# Patient Record
Sex: FEMALE | Race: BLACK OR AFRICAN AMERICAN | NOT HISPANIC OR LATINO | URBAN - METROPOLITAN AREA
[De-identification: names, ages, dates, MRNs, and addresses within clinical notes are randomized per-mention and may not be internally consistent; named-entity substitution may affect disease eponyms.]

---

## 2017-04-16 ENCOUNTER — EMERGENCY (EMERGENCY)
Facility: HOSPITAL | Age: 27
LOS: 0 days | Discharge: HOME | End: 2017-04-16
Attending: EMERGENCY MEDICINE
Payer: COMMERCIAL

## 2017-04-16 VITALS
RESPIRATION RATE: 18 BRPM | SYSTOLIC BLOOD PRESSURE: 124 MMHG | TEMPERATURE: 101 F | DIASTOLIC BLOOD PRESSURE: 84 MMHG | HEART RATE: 102 BPM | HEIGHT: 62 IN | OXYGEN SATURATION: 100 % | WEIGHT: 175.05 LBS

## 2017-04-16 VITALS
DIASTOLIC BLOOD PRESSURE: 77 MMHG | OXYGEN SATURATION: 100 % | TEMPERATURE: 99 F | RESPIRATION RATE: 16 BRPM | SYSTOLIC BLOOD PRESSURE: 122 MMHG | HEART RATE: 88 BPM

## 2017-04-16 DIAGNOSIS — J06.9 ACUTE UPPER RESPIRATORY INFECTION, UNSPECIFIED: ICD-10-CM

## 2017-04-16 DIAGNOSIS — R52 PAIN, UNSPECIFIED: ICD-10-CM

## 2017-04-16 PROCEDURE — 99283 EMERGENCY DEPT VISIT LOW MDM: CPT | Mod: 25

## 2017-04-16 RX ORDER — ACETAMINOPHEN 500 MG
650 TABLET ORAL ONCE
Qty: 0 | Refills: 0 | Status: COMPLETED | OUTPATIENT
Start: 2017-04-16 | End: 2017-04-16

## 2017-04-16 RX ADMIN — Medication 650 MILLIGRAM(S): at 02:04

## 2017-04-16 RX ADMIN — Medication 100 MILLIGRAM(S): at 02:16

## 2017-04-16 NOTE — ED PROVIDER NOTE - OBJECTIVE STATEMENT
Pt is a 27 yo lady with no significant past medical history who presents to the ED with cough, sore throat, and rhinorrhea. Has had flu vaccine. Symptoms stated yesterday, and had a fever to 101. Nonproductive cough, no chest pain, no sob, no abdominal pain. 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 No dysuria.

## 2018-05-16 ENCOUNTER — EMERGENCY (EMERGENCY)
Facility: HOSPITAL | Age: 28
LOS: 1 days | Discharge: HOME | End: 2018-05-16
Attending: EMERGENCY MEDICINE | Admitting: EMERGENCY MEDICINE
Payer: COMMERCIAL

## 2018-05-16 VITALS
SYSTOLIC BLOOD PRESSURE: 147 MMHG | DIASTOLIC BLOOD PRESSURE: 78 MMHG | RESPIRATION RATE: 16 BRPM | HEART RATE: 105 BPM | OXYGEN SATURATION: 100 % | TEMPERATURE: 99 F

## 2018-05-16 VITALS
DIASTOLIC BLOOD PRESSURE: 86 MMHG | OXYGEN SATURATION: 98 % | RESPIRATION RATE: 24 BRPM | SYSTOLIC BLOOD PRESSURE: 157 MMHG | HEART RATE: 130 BPM | TEMPERATURE: 99 F

## 2018-05-16 LAB
ALBUMIN SERPL ELPH-MCNC: 3.9 G/DL — SIGNIFICANT CHANGE UP (ref 3.3–5)
ALP SERPL-CCNC: 42 U/L — SIGNIFICANT CHANGE UP (ref 40–120)
ALT FLD-CCNC: 15 U/L — SIGNIFICANT CHANGE UP (ref 4–33)
AST SERPL-CCNC: 16 U/L — SIGNIFICANT CHANGE UP (ref 4–32)
BASOPHILS # BLD AUTO: 0.05 K/UL — SIGNIFICANT CHANGE UP (ref 0–0.2)
BASOPHILS NFR BLD AUTO: 0.4 % — SIGNIFICANT CHANGE UP (ref 0–2)
BILIRUB SERPL-MCNC: 0.3 MG/DL — SIGNIFICANT CHANGE UP (ref 0.2–1.2)
BUN SERPL-MCNC: 12 MG/DL — SIGNIFICANT CHANGE UP (ref 7–23)
CALCIUM SERPL-MCNC: 9.1 MG/DL — SIGNIFICANT CHANGE UP (ref 8.4–10.5)
CHLORIDE SERPL-SCNC: 97 MMOL/L — LOW (ref 98–107)
CO2 SERPL-SCNC: 20 MMOL/L — LOW (ref 22–31)
CREAT SERPL-MCNC: 0.69 MG/DL — SIGNIFICANT CHANGE UP (ref 0.5–1.3)
EOSINOPHIL # BLD AUTO: 0.04 K/UL — SIGNIFICANT CHANGE UP (ref 0–0.5)
EOSINOPHIL NFR BLD AUTO: 0.3 % — SIGNIFICANT CHANGE UP (ref 0–6)
GLUCOSE SERPL-MCNC: 85 MG/DL — SIGNIFICANT CHANGE UP (ref 70–99)
HCG SERPL-ACNC: < 5 MIU/ML — SIGNIFICANT CHANGE UP
HCT VFR BLD CALC: 38.6 % — SIGNIFICANT CHANGE UP (ref 34.5–45)
HGB BLD-MCNC: 12.7 G/DL — SIGNIFICANT CHANGE UP (ref 11.5–15.5)
IMM GRANULOCYTES # BLD AUTO: 0.05 # — SIGNIFICANT CHANGE UP
IMM GRANULOCYTES NFR BLD AUTO: 0.4 % — SIGNIFICANT CHANGE UP (ref 0–1.5)
LYMPHOCYTES # BLD AUTO: 16.9 % — SIGNIFICANT CHANGE UP (ref 13–44)
LYMPHOCYTES # BLD AUTO: 2.26 K/UL — SIGNIFICANT CHANGE UP (ref 1–3.3)
MCHC RBC-ENTMCNC: 28 PG — SIGNIFICANT CHANGE UP (ref 27–34)
MCHC RBC-ENTMCNC: 32.9 % — SIGNIFICANT CHANGE UP (ref 32–36)
MCV RBC AUTO: 85 FL — SIGNIFICANT CHANGE UP (ref 80–100)
MONOCYTES # BLD AUTO: 1.08 K/UL — HIGH (ref 0–0.9)
MONOCYTES NFR BLD AUTO: 8.1 % — SIGNIFICANT CHANGE UP (ref 2–14)
NEUTROPHILS # BLD AUTO: 9.89 K/UL — HIGH (ref 1.8–7.4)
NEUTROPHILS NFR BLD AUTO: 73.9 % — SIGNIFICANT CHANGE UP (ref 43–77)
NRBC # FLD: 0 — SIGNIFICANT CHANGE UP
PLATELET # BLD AUTO: 268 K/UL — SIGNIFICANT CHANGE UP (ref 150–400)
PMV BLD: 9.8 FL — SIGNIFICANT CHANGE UP (ref 7–13)
POTASSIUM SERPL-MCNC: 3.7 MMOL/L — SIGNIFICANT CHANGE UP (ref 3.5–5.3)
POTASSIUM SERPL-SCNC: 3.7 MMOL/L — SIGNIFICANT CHANGE UP (ref 3.5–5.3)
PROT SERPL-MCNC: 7.6 G/DL — SIGNIFICANT CHANGE UP (ref 6–8.3)
RBC # BLD: 4.54 M/UL — SIGNIFICANT CHANGE UP (ref 3.8–5.2)
RBC # FLD: 13.8 % — SIGNIFICANT CHANGE UP (ref 10.3–14.5)
SODIUM SERPL-SCNC: 134 MMOL/L — LOW (ref 135–145)
WBC # BLD: 13.37 K/UL — HIGH (ref 3.8–10.5)
WBC # FLD AUTO: 13.37 K/UL — HIGH (ref 3.8–10.5)

## 2018-05-16 PROCEDURE — 70450 CT HEAD/BRAIN W/O DYE: CPT | Mod: 26

## 2018-05-16 PROCEDURE — 99285 EMERGENCY DEPT VISIT HI MDM: CPT | Mod: 25

## 2018-05-16 RX ORDER — DEXAMETHASONE 0.5 MG/5ML
10 ELIXIR ORAL ONCE
Qty: 0 | Refills: 0 | Status: COMPLETED | OUTPATIENT
Start: 2018-05-16 | End: 2018-05-16

## 2018-05-16 RX ORDER — SODIUM CHLORIDE 9 MG/ML
1000 INJECTION INTRAMUSCULAR; INTRAVENOUS; SUBCUTANEOUS ONCE
Qty: 0 | Refills: 0 | Status: COMPLETED | OUTPATIENT
Start: 2018-05-16 | End: 2018-05-16

## 2018-05-16 RX ORDER — ACETAMINOPHEN 500 MG
650 TABLET ORAL ONCE
Qty: 0 | Refills: 0 | Status: COMPLETED | OUTPATIENT
Start: 2018-05-16 | End: 2018-05-16

## 2018-05-16 RX ORDER — ONDANSETRON 8 MG/1
4 TABLET, FILM COATED ORAL ONCE
Qty: 0 | Refills: 0 | Status: COMPLETED | OUTPATIENT
Start: 2018-05-16 | End: 2018-05-16

## 2018-05-16 RX ORDER — MORPHINE SULFATE 50 MG/1
2 CAPSULE, EXTENDED RELEASE ORAL ONCE
Qty: 0 | Refills: 0 | Status: DISCONTINUED | OUTPATIENT
Start: 2018-05-16 | End: 2018-05-16

## 2018-05-16 RX ORDER — KETOROLAC TROMETHAMINE 30 MG/ML
15 SYRINGE (ML) INJECTION ONCE
Qty: 0 | Refills: 0 | Status: DISCONTINUED | OUTPATIENT
Start: 2018-05-16 | End: 2018-05-16

## 2018-05-16 RX ADMIN — Medication 650 MILLIGRAM(S): at 04:37

## 2018-05-16 RX ADMIN — Medication 15 MILLIGRAM(S): at 06:27

## 2018-05-16 RX ADMIN — Medication 650 MILLIGRAM(S): at 06:06

## 2018-05-16 RX ADMIN — SODIUM CHLORIDE 1000 MILLILITER(S): 9 INJECTION INTRAMUSCULAR; INTRAVENOUS; SUBCUTANEOUS at 06:06

## 2018-05-16 RX ADMIN — Medication 102 MILLIGRAM(S): at 04:57

## 2018-05-16 RX ADMIN — MORPHINE SULFATE 2 MILLIGRAM(S): 50 CAPSULE, EXTENDED RELEASE ORAL at 04:31

## 2018-05-16 RX ADMIN — MORPHINE SULFATE 2 MILLIGRAM(S): 50 CAPSULE, EXTENDED RELEASE ORAL at 04:18

## 2018-05-16 RX ADMIN — SODIUM CHLORIDE 1000 MILLILITER(S): 9 INJECTION INTRAMUSCULAR; INTRAVENOUS; SUBCUTANEOUS at 04:18

## 2018-05-16 RX ADMIN — ONDANSETRON 4 MILLIGRAM(S): 8 TABLET, FILM COATED ORAL at 04:18

## 2018-05-16 NOTE — ED PROVIDER NOTE - PHYSICAL EXAMINATION
+b/l cervical LAD. +b/l tonsillar erythema and hypertrophy, + exudates.   no LE edema, normal equal distal pulses.   No spinal ttp, neck FROM. Strength 5/5. No bony ttp, FROM all extremities. Normal equal distal pulses.

## 2018-05-16 NOTE — ED PROVIDER NOTE - MEDICAL DECISION MAKING DETAILS
see attg note 27F no PMH p/w several hrs of throat pain/f/c then LOC in setting of working and feeling lightheaded. Slight tachycardia, other vitals wnl. Exam as above.  ddx: Likely pharyngitis (viral vs. bacterial) w/ subsequent syncope (vasovagal/dehydration).   CTH, basic labs, IVF, symptom control, decadron, reassess.

## 2018-05-16 NOTE — ED ADULT TRIAGE NOTE - CHIEF COMPLAINT QUOTE
Pt received on stretcher as rapid response. Pt is employee in the hospital. Pt was walking in [place], slipped and fell backwards, unwitnessed, hit back of head. Approx. down time 10 seconds. Pt appears uncomfortable, guarding, coughing, tearful.

## 2018-05-16 NOTE — ED PROVIDER NOTE - CARE PLAN
Principal Discharge DX:	LOC (loss of consciousness) Principal Discharge DX:	LOC (loss of consciousness)  Secondary Diagnosis:	Pharyngitis

## 2018-05-16 NOTE — ED PROVIDER NOTE - OBJECTIVE STATEMENT
27F no PMH p/w LOC. Pt floor RN working overnight, was fine upon arrival to work then developed throat pain and subjective f/c. Was in pt's room adjusting medication when pt felt lightheaded then remembers waking up on floor. Now only c/o throat pain and frontal HA. Denies neck/back pain, abd pain, SOB/CP, urinary complaints, black/bloody stool, LE pain/swelling, recent travel/immobilization, vision changes, weakness/numbness. No prior syncope. No FMH CAD/clots/sudden death.  Was brought in as rapid response. no actual witnesses to event here in ED.

## 2018-05-16 NOTE — ED ADULT NURSE NOTE - OBJECTIVE STATEMENT
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 pt states she has been feeling sick and her throat is sore with body aches and she got dizzy and synopsized. pt states hit her head and has a headache. pt states wants to be tested for strep. pt gvn surgical mask. pt IV accessed in Amb[facility] with 20 gauge LAC labs sent. waiting further orders will make primary RN Dariela aware of status.

## 2018-05-16 NOTE — ED PROVIDER NOTE - PROGRESS NOTE DETAILS
[place]: CT, labs no acute pathology. Feeling much better, comfortable for dc. Given copy of results.

## 2018-05-16 NOTE — ED PROVIDER NOTE - ATTENDING CONTRIBUTION TO CARE
27F no PMH p/w several hrs of throat pain/f/c then LOC in setting of working and feeling lightheaded.

## 2018-05-16 NOTE — ED PROVIDER NOTE - THROAT FINDINGS
OROPHARYNGEAL EXUDATE/THROAT RED/NO STRIDOR/no PTA/NO DROOLING/uvula midline/NO VESICLES/ULCERS/NO TONGUE ELEVATION

## 2018-05-16 NOTE — CHART NOTE - NSCHARTNOTEFT_GEN_A_CORE
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 Patient was found already on the ground.  Witness claimed patient was talking initially, but then passed out, fell on the ground, and woke up within 10 seconds. Likely vasovagal.   Patient was complaining of headache and having dry heaves and coughing spells.  Initial vitals showed patient was tachycardic 150-160s. Patient's BP was stable, and oxygenation was normal.   Patient transported on stretcher to ED.     Estrella Ayala DO  PGY-3 Internal Medicine  MAR [id number]

## 2019-01-04 ENCOUNTER — TRANSCRIPTION ENCOUNTER (OUTPATIENT)
Age: 29
End: 2019-01-04

## 2019-02-25 ENCOUNTER — TRANSCRIPTION ENCOUNTER (OUTPATIENT)
Age: 29
End: 2019-02-25

## 2019-02-28 ENCOUNTER — EMERGENCY (EMERGENCY)
Facility: HOSPITAL | Age: 29
LOS: 1 days | Discharge: HOME | End: 2019-02-28
Attending: EMERGENCY MEDICINE | Admitting: EMERGENCY MEDICINE
Payer: COMMERCIAL

## 2019-02-28 VITALS
RESPIRATION RATE: 22 BRPM | DIASTOLIC BLOOD PRESSURE: 82 MMHG | SYSTOLIC BLOOD PRESSURE: 134 MMHG | HEART RATE: 99 BPM | OXYGEN SATURATION: 97 % | TEMPERATURE: 99 F

## 2019-02-28 VITALS
SYSTOLIC BLOOD PRESSURE: 140 MMHG | DIASTOLIC BLOOD PRESSURE: 88 MMHG | RESPIRATION RATE: 18 BRPM | OXYGEN SATURATION: 100 % | TEMPERATURE: 99 F | HEART RATE: 134 BPM

## 2019-02-28 LAB
ALBUMIN SERPL ELPH-MCNC: 3.6 G/DL — SIGNIFICANT CHANGE UP (ref 3.3–5)
ALP SERPL-CCNC: 32 U/L — LOW (ref 40–120)
ALT FLD-CCNC: 13 U/L — SIGNIFICANT CHANGE UP (ref 4–33)
ANION GAP SERPL CALC-SCNC: 12 MMO/L — SIGNIFICANT CHANGE UP (ref 7–14)
AST SERPL-CCNC: 14 U/L — SIGNIFICANT CHANGE UP (ref 4–32)
BASOPHILS # BLD AUTO: 0.06 K/UL — SIGNIFICANT CHANGE UP (ref 0–0.2)
BASOPHILS NFR BLD AUTO: 1.1 % — SIGNIFICANT CHANGE UP (ref 0–2)
BILIRUB SERPL-MCNC: < 0.2 MG/DL — LOW (ref 0.2–1.2)
BUN SERPL-MCNC: 17 MG/DL — SIGNIFICANT CHANGE UP (ref 7–23)
CALCIUM SERPL-MCNC: 9.2 MG/DL — SIGNIFICANT CHANGE UP (ref 8.4–10.5)
CHLORIDE SERPL-SCNC: 107 MMOL/L — SIGNIFICANT CHANGE UP (ref 98–107)
CO2 SERPL-SCNC: 21 MMOL/L — LOW (ref 22–31)
CREAT SERPL-MCNC: 0.86 MG/DL — SIGNIFICANT CHANGE UP (ref 0.5–1.3)
EOSINOPHIL # BLD AUTO: 0.27 K/UL — SIGNIFICANT CHANGE UP (ref 0–0.5)
EOSINOPHIL NFR BLD AUTO: 4.9 % — SIGNIFICANT CHANGE UP (ref 0–6)
GLUCOSE SERPL-MCNC: 104 MG/DL — HIGH (ref 70–99)
HCG SERPL-ACNC: < 5 MIU/ML — SIGNIFICANT CHANGE UP
HCT VFR BLD CALC: 42.6 % — SIGNIFICANT CHANGE UP (ref 34.5–45)
HGB BLD-MCNC: 13 G/DL — SIGNIFICANT CHANGE UP (ref 11.5–15.5)
IMM GRANULOCYTES NFR BLD AUTO: 0.4 % — SIGNIFICANT CHANGE UP (ref 0–1.5)
LYMPHOCYTES # BLD AUTO: 2.57 K/UL — SIGNIFICANT CHANGE UP (ref 1–3.3)
LYMPHOCYTES # BLD AUTO: 46.9 % — HIGH (ref 13–44)
MCHC RBC-ENTMCNC: 27.6 PG — SIGNIFICANT CHANGE UP (ref 27–34)
MCHC RBC-ENTMCNC: 30.5 % — LOW (ref 32–36)
MCV RBC AUTO: 90.4 FL — SIGNIFICANT CHANGE UP (ref 80–100)
MONOCYTES # BLD AUTO: 0.8 K/UL — SIGNIFICANT CHANGE UP (ref 0–0.9)
MONOCYTES NFR BLD AUTO: 14.6 % — HIGH (ref 2–14)
NEUTROPHILS # BLD AUTO: 1.76 K/UL — LOW (ref 1.8–7.4)
NEUTROPHILS NFR BLD AUTO: 32.1 % — LOW (ref 43–77)
NRBC # FLD: 0 K/UL — LOW (ref 25–125)
PLATELET # BLD AUTO: 251 K/UL — SIGNIFICANT CHANGE UP (ref 150–400)
PMV BLD: 9.6 FL — SIGNIFICANT CHANGE UP (ref 7–13)
POTASSIUM SERPL-MCNC: 4.6 MMOL/L — SIGNIFICANT CHANGE UP (ref 3.5–5.3)
POTASSIUM SERPL-SCNC: 4.6 MMOL/L — SIGNIFICANT CHANGE UP (ref 3.5–5.3)
PROT SERPL-MCNC: 7.2 G/DL — SIGNIFICANT CHANGE UP (ref 6–8.3)
RBC # BLD: 4.71 M/UL — SIGNIFICANT CHANGE UP (ref 3.8–5.2)
RBC # FLD: 14.3 % — SIGNIFICANT CHANGE UP (ref 10.3–14.5)
SODIUM SERPL-SCNC: 140 MMOL/L — SIGNIFICANT CHANGE UP (ref 135–145)
WBC # BLD: 5.48 K/UL — SIGNIFICANT CHANGE UP (ref 3.8–10.5)
WBC # FLD AUTO: 5.48 K/UL — SIGNIFICANT CHANGE UP (ref 3.8–10.5)

## 2019-02-28 PROCEDURE — 99283 EMERGENCY DEPT VISIT LOW MDM: CPT | Mod: 25

## 2019-02-28 PROCEDURE — 71046 X-RAY EXAM CHEST 2 VIEWS: CPT | Mod: 26

## 2019-02-28 RX ORDER — SODIUM CHLORIDE 9 MG/ML
1000 INJECTION INTRAMUSCULAR; INTRAVENOUS; SUBCUTANEOUS ONCE
Qty: 0 | Refills: 0 | Status: COMPLETED | OUTPATIENT
Start: 2019-02-28 | End: 2019-02-28

## 2019-02-28 RX ORDER — BENZOCAINE AND MENTHOL 5; 1 G/100ML; G/100ML
1 LIQUID ORAL ONCE
Qty: 0 | Refills: 0 | Status: COMPLETED | OUTPATIENT
Start: 2019-02-28 | End: 2019-02-28

## 2019-02-28 RX ORDER — ACETAMINOPHEN 500 MG
650 TABLET ORAL ONCE
Qty: 0 | Refills: 0 | Status: COMPLETED | OUTPATIENT
Start: 2019-02-28 | End: 2019-02-28

## 2019-02-28 RX ORDER — IPRATROPIUM/ALBUTEROL SULFATE 18-103MCG
3 AEROSOL WITH ADAPTER (GRAM) INHALATION ONCE
Qty: 0 | Refills: 0 | Status: COMPLETED | OUTPATIENT
Start: 2019-02-28 | End: 2019-02-28

## 2019-02-28 RX ORDER — HYDROCODONE BITARTRATE AND HOMATROPINE METHYLBROMIDE 5; 1.5 MG/5ML; MG/5ML
1 SOLUTION ORAL
Qty: 12 | Refills: 0
Start: 2019-02-28 | End: 2019-03-02

## 2019-02-28 RX ORDER — BENZOCAINE AND MENTHOL 5; 1 G/100ML; G/100ML
1 LIQUID ORAL ONCE
Qty: 0 | Refills: 0 | Status: DISCONTINUED | OUTPATIENT
Start: 2019-02-28 | End: 2019-02-28

## 2019-02-28 RX ADMIN — Medication 100 MILLIGRAM(S): at 03:34

## 2019-02-28 RX ADMIN — SODIUM CHLORIDE 1000 MILLILITER(S): 9 INJECTION INTRAMUSCULAR; INTRAVENOUS; SUBCUTANEOUS at 02:46

## 2019-02-28 RX ADMIN — Medication 650 MILLIGRAM(S): at 03:15

## 2019-02-28 RX ADMIN — Medication 650 MILLIGRAM(S): at 02:15

## 2019-02-28 RX ADMIN — SODIUM CHLORIDE 1000 MILLILITER(S): 9 INJECTION INTRAMUSCULAR; INTRAVENOUS; SUBCUTANEOUS at 03:36

## 2019-02-28 RX ADMIN — Medication 3 MILLILITER(S): at 02:15

## 2019-02-28 NOTE — ED PROVIDER NOTE - ATTENDING CONTRIBUTION TO CARE
29 y/o F with no significant PMH here with 2 days of cough.  Pt reports dry cough, but persistent with episodes of post-tussive vomiting.  Subjective fevers at home.  Pt initially seen at urgent care and dx with bronchitis - she is taking tessalon, nyquil, and tylenol at home prn.  No chest pain ,sob, abd pain, nausea, leg pain or swelling.  Pt works as RN, mult sick contacts.  No recent travel.  Well appearing, lying comfortably in stretcher, awake and alert, nontoxic.  Low grade fever, tachycardic, VSS.  Lungs cta bl.  Cards nl S1/S2, RRR, no MRG.  Abd soft ntnd.  No pedal edema or calf tenderness.  Likely viral URI, will obtain CXR r/o pna, supportive care, reassess.

## 2019-02-28 NOTE — ED PROVIDER NOTE - OBJECTIVE STATEMENT
29yo F denies PMHx p/w CC cough. Pt. states that for past 2 days she has had worsening cough, now with occasional post tussive emesis, states she saw doctor as outpatient and was given inhaler, steroids and cough suppressant with little relief, feels like she is getting worse, states that she had the flu in January. Took tylenol and motrin at 7pm. Denies SOB N/D urinary symptoms.

## 2019-02-28 NOTE — ED ADULT NURSE REASSESSMENT NOTE - NS ED NURSE REASSESS COMMENT FT1
Pt states symptomatic imprvement, states readiness for DC to home.  MD Quintanilla @ bedside to review dc instructions, follow up, copy results provided to pt.  IV dc'd intact at time of dc by MAVIS irvin.  Stable and in no acute distress at time of exit, ambulatory to exit of ED

## 2019-02-28 NOTE — ED ADULT NURSE NOTE - OBJECTIVE STATEMENT
Pt rcvd to rm 1 w/ c/o dry hacking cough x2 days, states diagnosed w/ bronchitis.  RX'd antitussive, cough suppressant, inhaler & steroids.  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  Pt w/ audible inspiratory/expiratory wheezes bilaterally.  Denies sob, but endorses chest congestion and rib discomfort w/ cough.  No significant PMHx.  IV placed to R Hand 20g by float RN, labs drawn/sent as ordered.  Medicated, IVF NS Bolus infusing.  Rpt VS improved from triage.  Will CTM pt.

## 2019-02-28 NOTE — ED PROVIDER NOTE - CLINICAL SUMMARY MEDICAL DECISION MAKING FREE TEXT BOX
29yo F no sig pmhx p/w CC cough - likely c/w URI, will send cxr to r/o PNA, basic labs, symptomatic relief.

## 2019-02-28 NOTE — ED PROVIDER NOTE - NSFOLLOWUPINSTRUCTIONS_ED_ALL_ED_FT
Drink plenty of fluids.  You can take over-the-counter cough medications as needed for cough.  You can take ibuprofen 600mg every 6 hours or Tylenol 650mg every 4 hours as needed for pain or fever.  Follow-up with your PMD in 24-48 hours.  Return to the emergency department for any new or worsening symptoms.

## 2019-03-19 PROBLEM — Z00.00 ENCOUNTER FOR PREVENTIVE HEALTH EXAMINATION: Status: ACTIVE | Noted: 2019-03-19

## 2019-04-26 ENCOUNTER — APPOINTMENT (OUTPATIENT)
Dept: BARIATRICS/WEIGHT MGMT | Facility: CLINIC | Age: 29
End: 2019-04-26

## 2019-06-27 ENCOUNTER — APPOINTMENT (OUTPATIENT)
Dept: DERMATOLOGY | Facility: CLINIC | Age: 29
End: 2019-06-27
Payer: COMMERCIAL

## 2019-06-27 VITALS — HEIGHT: 63 IN | BODY MASS INDEX: 34.38 KG/M2 | WEIGHT: 194 LBS

## 2019-06-27 DIAGNOSIS — L70.0 ACNE VULGARIS: ICD-10-CM

## 2019-06-27 DIAGNOSIS — Z91.89 OTHER SPECIFIED PERSONAL RISK FACTORS, NOT ELSEWHERE CLASSIFIED: ICD-10-CM

## 2019-06-27 DIAGNOSIS — B08.1 MOLLUSCUM CONTAGIOSUM: ICD-10-CM

## 2019-06-27 PROCEDURE — 99203 OFFICE O/P NEW LOW 30 MIN: CPT | Mod: 25

## 2019-06-27 PROCEDURE — 17110 DESTRUCTION B9 LES UP TO 14: CPT

## 2019-06-27 RX ORDER — TRETINOIN 1 MG/G
0.1 CREAM TOPICAL
Qty: 1 | Refills: 5 | Status: ACTIVE | COMMUNITY
Start: 2019-06-27 | End: 1900-01-01

## 2019-10-25 ENCOUNTER — TRANSCRIPTION ENCOUNTER (OUTPATIENT)
Age: 29
End: 2019-10-25

## 2020-04-26 ENCOUNTER — MESSAGE (OUTPATIENT)
Age: 30
End: 2020-04-26

## 2020-05-12 LAB
SARS-COV-2 IGG SERPL IA-ACNC: 0 INDEX
SARS-COV-2 IGG SERPL QL IA: NEGATIVE

## 2021-01-22 ENCOUNTER — EMERGENCY (EMERGENCY)
Facility: HOSPITAL | Age: 31
LOS: 1 days | Discharge: HOME | End: 2021-01-22
Attending: EMERGENCY MEDICINE | Admitting: EMERGENCY MEDICINE
Payer: COMMERCIAL

## 2021-01-22 VITALS
OXYGEN SATURATION: 99 % | RESPIRATION RATE: 18 BRPM | HEIGHT: 62 IN | TEMPERATURE: 100 F | DIASTOLIC BLOOD PRESSURE: 77 MMHG | SYSTOLIC BLOOD PRESSURE: 120 MMHG | HEART RATE: 122 BPM

## 2021-01-22 PROCEDURE — 99285 EMERGENCY DEPT VISIT HI MDM: CPT

## 2021-01-22 RX ORDER — ONDANSETRON 8 MG/1
4 TABLET, FILM COATED ORAL ONCE
Refills: 0 | Status: COMPLETED | OUTPATIENT
Start: 2021-01-22 | End: 2021-01-22

## 2021-01-22 RX ORDER — ACETAMINOPHEN 500 MG
1000 TABLET ORAL ONCE
Refills: 0 | Status: COMPLETED | OUTPATIENT
Start: 2021-01-22 | End: 2021-01-23

## 2021-01-22 RX ORDER — SODIUM CHLORIDE 9 MG/ML
1000 INJECTION INTRAMUSCULAR; INTRAVENOUS; SUBCUTANEOUS ONCE
Refills: 0 | Status: COMPLETED | OUTPATIENT
Start: 2021-01-22 | End: 2021-01-22

## 2021-01-22 RX ORDER — MORPHINE SULFATE 50 MG/1
4 CAPSULE, EXTENDED RELEASE ORAL ONCE
Refills: 0 | Status: DISCONTINUED | OUTPATIENT
Start: 2021-01-22 | End: 2021-01-22

## 2021-01-22 NOTE — ED ADULT TRIAGE NOTE - CHIEF COMPLAINT QUOTE
Covid+ 1/20/21, Pt employee on 9S, has been having abdominal pain, n/v/d since Monday. Denies chest pain, SOB, fevers, chills. Denies any past medical history

## 2021-01-23 VITALS
OXYGEN SATURATION: 100 % | DIASTOLIC BLOOD PRESSURE: 82 MMHG | HEART RATE: 90 BPM | SYSTOLIC BLOOD PRESSURE: 122 MMHG | RESPIRATION RATE: 18 BRPM

## 2021-01-23 LAB
ALBUMIN SERPL ELPH-MCNC: 3.6 G/DL — SIGNIFICANT CHANGE UP (ref 3.3–5)
ALP SERPL-CCNC: 33 U/L — LOW (ref 40–120)
ALT FLD-CCNC: 9 U/L — SIGNIFICANT CHANGE UP (ref 4–33)
ANION GAP SERPL CALC-SCNC: 11 MMOL/L — SIGNIFICANT CHANGE UP (ref 7–14)
APPEARANCE UR: CLEAR — SIGNIFICANT CHANGE UP
AST SERPL-CCNC: 18 U/L — SIGNIFICANT CHANGE UP (ref 4–32)
BASOPHILS # BLD AUTO: 0 K/UL — SIGNIFICANT CHANGE UP (ref 0–0.2)
BASOPHILS NFR BLD AUTO: 0 % — SIGNIFICANT CHANGE UP (ref 0–2)
BILIRUB SERPL-MCNC: 0.2 MG/DL — SIGNIFICANT CHANGE UP (ref 0.2–1.2)
BILIRUB UR-MCNC: NEGATIVE — SIGNIFICANT CHANGE UP
BLOOD GAS VENOUS COMPREHENSIVE RESULT: SIGNIFICANT CHANGE UP
BUN SERPL-MCNC: 12 MG/DL — SIGNIFICANT CHANGE UP (ref 7–23)
CALCIUM SERPL-MCNC: 8.2 MG/DL — LOW (ref 8.4–10.5)
CHLORIDE SERPL-SCNC: 105 MMOL/L — SIGNIFICANT CHANGE UP (ref 98–107)
CO2 SERPL-SCNC: 23 MMOL/L — SIGNIFICANT CHANGE UP (ref 22–31)
COLOR SPEC: YELLOW — SIGNIFICANT CHANGE UP
CREAT SERPL-MCNC: 0.82 MG/DL — SIGNIFICANT CHANGE UP (ref 0.5–1.3)
DIFF PNL FLD: NEGATIVE — SIGNIFICANT CHANGE UP
EOSINOPHIL # BLD AUTO: 0 K/UL — SIGNIFICANT CHANGE UP (ref 0–0.5)
EOSINOPHIL NFR BLD AUTO: 0 % — SIGNIFICANT CHANGE UP (ref 0–6)
GLUCOSE SERPL-MCNC: 99 MG/DL — SIGNIFICANT CHANGE UP (ref 70–99)
GLUCOSE UR QL: NEGATIVE — SIGNIFICANT CHANGE UP
HCG SERPL-ACNC: <5 MIU/ML — SIGNIFICANT CHANGE UP
HCT VFR BLD CALC: 41.1 % — SIGNIFICANT CHANGE UP (ref 34.5–45)
HGB BLD-MCNC: 12.9 G/DL — SIGNIFICANT CHANGE UP (ref 11.5–15.5)
IANC: 2.72 K/UL — SIGNIFICANT CHANGE UP (ref 1.5–8.5)
KETONES UR-MCNC: ABNORMAL
LEUKOCYTE ESTERASE UR-ACNC: NEGATIVE — SIGNIFICANT CHANGE UP
LYMPHOCYTES # BLD AUTO: 0.75 K/UL — LOW (ref 1–3.3)
LYMPHOCYTES # BLD AUTO: 19.6 % — SIGNIFICANT CHANGE UP (ref 13–44)
MCHC RBC-ENTMCNC: 27.2 PG — SIGNIFICANT CHANGE UP (ref 27–34)
MCHC RBC-ENTMCNC: 31.4 GM/DL — LOW (ref 32–36)
MCV RBC AUTO: 86.5 FL — SIGNIFICANT CHANGE UP (ref 80–100)
MONOCYTES # BLD AUTO: 0.17 K/UL — SIGNIFICANT CHANGE UP (ref 0–0.9)
MONOCYTES NFR BLD AUTO: 4.5 % — SIGNIFICANT CHANGE UP (ref 2–14)
NEUTROPHILS # BLD AUTO: 2.73 K/UL — SIGNIFICANT CHANGE UP (ref 1.8–7.4)
NEUTROPHILS NFR BLD AUTO: 43.7 % — SIGNIFICANT CHANGE UP (ref 43–77)
NITRITE UR-MCNC: NEGATIVE — SIGNIFICANT CHANGE UP
PH UR: 6.5 — SIGNIFICANT CHANGE UP (ref 5–8)
PLATELET # BLD AUTO: 176 K/UL — SIGNIFICANT CHANGE UP (ref 150–400)
POTASSIUM SERPL-MCNC: 3.5 MMOL/L — SIGNIFICANT CHANGE UP (ref 3.5–5.3)
POTASSIUM SERPL-SCNC: 3.5 MMOL/L — SIGNIFICANT CHANGE UP (ref 3.5–5.3)
PROT SERPL-MCNC: 7 G/DL — SIGNIFICANT CHANGE UP (ref 6–8.3)
PROT UR-MCNC: ABNORMAL
RBC # BLD: 4.75 M/UL — SIGNIFICANT CHANGE UP (ref 3.8–5.2)
RBC # FLD: 14.6 % — HIGH (ref 10.3–14.5)
SARS-COV-2 RNA SPEC QL NAA+PROBE: DETECTED
SODIUM SERPL-SCNC: 139 MMOL/L — SIGNIFICANT CHANGE UP (ref 135–145)
SP GR SPEC: >1.05 (ref 1.01–1.02)
UROBILINOGEN FLD QL: ABNORMAL
WBC # BLD: 3.83 K/UL — SIGNIFICANT CHANGE UP (ref 3.8–10.5)
WBC # FLD AUTO: 3.83 K/UL — SIGNIFICANT CHANGE UP (ref 3.8–10.5)

## 2021-01-23 PROCEDURE — 74177 CT ABD & PELVIS W/CONTRAST: CPT | Mod: 26

## 2021-01-23 PROCEDURE — 76830 TRANSVAGINAL US NON-OB: CPT | Mod: 26

## 2021-01-23 PROCEDURE — 93975 VASCULAR STUDY: CPT | Mod: 26

## 2021-01-23 RX ORDER — HALOPERIDOL DECANOATE 100 MG/ML
5 INJECTION INTRAMUSCULAR ONCE
Refills: 0 | Status: COMPLETED | OUTPATIENT
Start: 2021-01-23 | End: 2021-01-23

## 2021-01-23 RX ORDER — SODIUM CHLORIDE 9 MG/ML
1000 INJECTION INTRAMUSCULAR; INTRAVENOUS; SUBCUTANEOUS ONCE
Refills: 0 | Status: COMPLETED | OUTPATIENT
Start: 2021-01-23 | End: 2021-01-23

## 2021-01-23 RX ORDER — METOCLOPRAMIDE HCL 10 MG
10 TABLET ORAL ONCE
Refills: 0 | Status: COMPLETED | OUTPATIENT
Start: 2021-01-23 | End: 2021-01-23

## 2021-01-23 RX ORDER — MORPHINE SULFATE 50 MG/1
2 CAPSULE, EXTENDED RELEASE ORAL ONCE
Refills: 0 | Status: DISCONTINUED | OUTPATIENT
Start: 2021-01-23 | End: 2021-01-23

## 2021-01-23 RX ADMIN — HALOPERIDOL DECANOATE 5 MILLIGRAM(S): 100 INJECTION INTRAMUSCULAR at 06:32

## 2021-01-23 RX ADMIN — MORPHINE SULFATE 2 MILLIGRAM(S): 50 CAPSULE, EXTENDED RELEASE ORAL at 02:18

## 2021-01-23 RX ADMIN — SODIUM CHLORIDE 1000 MILLILITER(S): 9 INJECTION INTRAMUSCULAR; INTRAVENOUS; SUBCUTANEOUS at 02:37

## 2021-01-23 RX ADMIN — SODIUM CHLORIDE 1000 MILLILITER(S): 9 INJECTION INTRAMUSCULAR; INTRAVENOUS; SUBCUTANEOUS at 04:01

## 2021-01-23 RX ADMIN — MORPHINE SULFATE 4 MILLIGRAM(S): 50 CAPSULE, EXTENDED RELEASE ORAL at 00:08

## 2021-01-23 RX ADMIN — SODIUM CHLORIDE 1000 MILLILITER(S): 9 INJECTION INTRAMUSCULAR; INTRAVENOUS; SUBCUTANEOUS at 00:08

## 2021-01-23 RX ADMIN — Medication 10 MILLIGRAM(S): at 04:01

## 2021-01-23 RX ADMIN — ONDANSETRON 4 MILLIGRAM(S): 8 TABLET, FILM COATED ORAL at 00:08

## 2021-01-23 RX ADMIN — Medication 400 MILLIGRAM(S): at 00:08

## 2021-01-23 NOTE — ED PROVIDER NOTE - ATTENDING CONTRIBUTION TO CARE
elvis: pt is covid positive, 4 days ago, started as well with nausea, vomiting and diarrhea at the same time.  pt here with cough, abd grossly tender diffusely,  gyn exam: os closed no cmt, pos stenderenss left sided wall and bladder wall, not right or posterior.  pt no pregnant, vomiting in the room despite antiemetics    I performed a history and physical exam of the patient and discussed their management with the resident and /or advanced care provider. I reviewed the resident and /or ACP's note and agree with the documented findings and plan of care. My medical decison making and observations are found above.

## 2021-01-23 NOTE — ED PROVIDER NOTE - PROGRESS NOTE DETAILS
Vivi Juarez PGY-1 patient reassessed, continues to endorse severe abd pain. unable to tolerate abd exam due to pain. needing multiple doses of morphine and nausea meds IV. likely will need admission for further workup. Dodd: patient states she feels better, appears comfortable, bands noted, cultures ordered, will dc and call back if positive, return precautions provided

## 2021-01-23 NOTE — ED PROVIDER NOTE - NS ED ROS FT
Constitutional: +fever  Eyes:  No visual changes  ENMT:  No neck pain  Cardiac:  No chest pain  Respiratory:  +cough  GI:  +abd pain + vomiting + diarrhea   :  No dysuria, hematuria  MS:  No back pain.  Neuro:  No headache or lightheadedness  Skin:  No skin rash  Endocrine: No history of thyroid disease or diabetes.  Except as documented in the HPI,  all other systems are negative.

## 2021-01-23 NOTE — ED PROVIDER NOTE - PATIENT PORTAL LINK FT
You can access the FollowMyHealth Patient Portal offered by [facility] by registering at the following website: [email or web address]. By joining [employer]’s FollowMyHealth portal, you will also be able to view your health information using other applications (apps) compatible with our system.

## 2021-01-23 NOTE — ED PROVIDER NOTE - PHYSICAL EXAMINATION
Vital signs reviewed  GENERAL: In acute distress due to pain   HEAD: NCAT  EYES: Anicteric  ENT: MMM  NECK: Supple, non tender  RESPIRATORY: Normal respiratory effort. CTA B/L. No wheezing, rales, rhonchi  CARDIOVASCULAR: tachycardic   ABDOMEN: soft, generalized tenderness with guarding.   MUSCULOSKELETAL/EXTREMITIES: Brisk cap refill. 2+ radial pulses. No leg edema.  SKIN:  Warm and dry  NEURO: AAOx3. No gross FND.  PSYCHIATRIC: Cooperative. Affect appropriate.

## 2021-01-23 NOTE — ED PROVIDER NOTE - NSFOLLOWUPINSTRUCTIONS_ED_ALL_ED_FT
Abdominal Pain    Many things can cause abdominal pain. Many times, abdominal pain is not caused by a disease and will improve without treatment. Your health care provider will do a physical exam to determine if there is a dangerous cause of your pain; blood tests and imaging may help determine the cause of your pain. However, in many cases, no cause may be found and you may need further testing as an outpatient. Monitor your abdominal pain for any changes.     SEEK IMMEDIATE MEDICAL CARE IF YOU HAVE ANY OF THE FOLLOWING SYMPTOMS: worsening abdominal pain, uncontrollable vomiting, profuse diarrhea, inability to have bowel movements or pass gas, black or bloody stools, fever accompanying chest pain or back pain, or fainting. These symptoms may represent a serious problem that is an emergency. Do not wait to see if the symptoms will go away. Get medical help right away. Call 911 and do not drive yourself to the hospital.    - Follow up with your primary care doctor in 1-2 days.    - Bring results with you to the appointment.   - Return to the ED for new or worsening symptoms.

## 2021-01-23 NOTE — ED ADULT NURSE NOTE - OBJECTIVE STATEMENT
patient received to room 6 A&Ox 3. ambulatory. Covid +. C/O nausea vomiting and abdominal pain since Monday. denies CP SOB dysuria hematuria. pmh migraines. 20G IV placed in Right AC. labs sent. awaiting CT and US. will continue to monitor.

## 2021-01-23 NOTE — ED PROVIDER NOTE - CLINICAL SUMMARY MEDICAL DECISION MAKING FREE TEXT BOX
elvis: pt is covid positive, 4 days ago, started as well with nausea, vomiting and diarrhea at the same time.  pt here with cough, abd grossly tender diffusely,  gyn exam: os closed no cmt, pos stenderenss left sided wall and bladder wall, not right or posterior.  pt no pregnant, vomiting in the room despite antiemetics

## 2021-01-23 NOTE — ED PROVIDER NOTE - OBJECTIVE STATEMENT
31 y/o F no past medical hx p/w abd pain, vomiting and diarrhea x 3-4 days. abd pain is severe 10/10, has been worsening over time. vomiting and diarrhea constant, unable to tolerate PO due to vomiting. pt tested positive for covid 3 days ago. endorsing cough, fever but no worsening sob/dyspnea  LMP 3 weeks ago, regular cycle. sexually active  pt denies cp, sob, dysuria, hematuria, recent travel, ill contacts.

## 2021-01-24 LAB
CULTURE RESULTS: NO GROWTH — SIGNIFICANT CHANGE UP
SPECIMEN SOURCE: SIGNIFICANT CHANGE UP

## 2021-01-27 ENCOUNTER — TRANSCRIPTION ENCOUNTER (OUTPATIENT)
Age: 31
End: 2021-01-27

## 2021-01-28 LAB
CULTURE RESULTS: SIGNIFICANT CHANGE UP
CULTURE RESULTS: SIGNIFICANT CHANGE UP
SPECIMEN SOURCE: SIGNIFICANT CHANGE UP
SPECIMEN SOURCE: SIGNIFICANT CHANGE UP

## 2021-04-25 ENCOUNTER — TRANSCRIPTION ENCOUNTER (OUTPATIENT)
Age: 31
End: 2021-04-25

## 2021-08-04 ENCOUNTER — TRANSCRIPTION ENCOUNTER (OUTPATIENT)
Age: 31
End: 2021-08-04

## 2021-12-13 NOTE — ED ADULT NURSE NOTE - NS ED NOTE ABUSE RESPONSE YN
Finasteride Pregnancy And Lactation Text: This medication is absolutely contraindicated during pregnancy. It is unknown if it is excreted in breast milk. no

## 2022-01-14 NOTE — ED ADULT NURSE NOTE - BREATH SOUNDS, MLM
Clear Crescentic Advancement Flap Text: The defect edges were debeveled with a #15 scalpel blade.  Given the location of the defect and the proximity to free margins a crescentic advancement flap was deemed most appropriate.  Using a sterile surgical marker, the appropriate advancement flap was drawn incorporating the defect and placing the expected incisions within the relaxed skin tension lines where possible.    The area thus outlined was incised deep to adipose tissue with a #15 scalpel blade.  The skin margins were undermined to an appropriate distance in all directions utilizing iris scissors.

## 2022-03-29 ENCOUNTER — EMERGENCY (EMERGENCY)
Facility: HOSPITAL | Age: 32
LOS: 1 days | Discharge: HOME | End: 2022-03-29
Attending: EMERGENCY MEDICINE | Admitting: EMERGENCY MEDICINE
Payer: COMMERCIAL

## 2022-03-29 VITALS
TEMPERATURE: 98 F | HEART RATE: 90 BPM | DIASTOLIC BLOOD PRESSURE: 88 MMHG | HEIGHT: 62 IN | OXYGEN SATURATION: 100 % | RESPIRATION RATE: 18 BRPM | SYSTOLIC BLOOD PRESSURE: 128 MMHG

## 2022-03-29 VITALS
DIASTOLIC BLOOD PRESSURE: 92 MMHG | HEART RATE: 90 BPM | TEMPERATURE: 98 F | SYSTOLIC BLOOD PRESSURE: 125 MMHG | RESPIRATION RATE: 18 BRPM | OXYGEN SATURATION: 100 %

## 2022-03-29 LAB
ALBUMIN SERPL ELPH-MCNC: 4 G/DL — SIGNIFICANT CHANGE UP (ref 3.3–5)
ALP SERPL-CCNC: 36 U/L — LOW (ref 40–120)
ALT FLD-CCNC: 15 U/L — SIGNIFICANT CHANGE UP (ref 4–33)
ANION GAP SERPL CALC-SCNC: 11 MMOL/L — SIGNIFICANT CHANGE UP (ref 7–14)
APPEARANCE UR: CLEAR — SIGNIFICANT CHANGE UP
APTT BLD: 32.1 SEC — SIGNIFICANT CHANGE UP (ref 27–36.3)
AST SERPL-CCNC: 14 U/L — SIGNIFICANT CHANGE UP (ref 4–32)
BACTERIA # UR AUTO: NEGATIVE — SIGNIFICANT CHANGE UP
BASOPHILS # BLD AUTO: 0.04 K/UL — SIGNIFICANT CHANGE UP (ref 0–0.2)
BASOPHILS NFR BLD AUTO: 0.5 % — SIGNIFICANT CHANGE UP (ref 0–2)
BILIRUB SERPL-MCNC: 0.3 MG/DL — SIGNIFICANT CHANGE UP (ref 0.2–1.2)
BILIRUB UR-MCNC: NEGATIVE — SIGNIFICANT CHANGE UP
BLOOD GAS VENOUS COMPREHENSIVE RESULT: SIGNIFICANT CHANGE UP
BUN SERPL-MCNC: 11 MG/DL — SIGNIFICANT CHANGE UP (ref 7–23)
CALCIUM SERPL-MCNC: 9.4 MG/DL — SIGNIFICANT CHANGE UP (ref 8.4–10.5)
CHLORIDE SERPL-SCNC: 104 MMOL/L — SIGNIFICANT CHANGE UP (ref 98–107)
CO2 SERPL-SCNC: 25 MMOL/L — SIGNIFICANT CHANGE UP (ref 22–31)
COLOR SPEC: YELLOW — SIGNIFICANT CHANGE UP
CREAT SERPL-MCNC: 0.8 MG/DL — SIGNIFICANT CHANGE UP (ref 0.5–1.3)
DIFF PNL FLD: NEGATIVE — SIGNIFICANT CHANGE UP
EGFR: 102 ML/MIN/1.73M2 — SIGNIFICANT CHANGE UP
EOSINOPHIL # BLD AUTO: 0.04 K/UL — SIGNIFICANT CHANGE UP (ref 0–0.5)
EOSINOPHIL NFR BLD AUTO: 0.5 % — SIGNIFICANT CHANGE UP (ref 0–6)
EPI CELLS # UR: 3 /HPF — SIGNIFICANT CHANGE UP (ref 0–5)
GLUCOSE SERPL-MCNC: 85 MG/DL — SIGNIFICANT CHANGE UP (ref 70–99)
GLUCOSE UR QL: NEGATIVE — SIGNIFICANT CHANGE UP
HCG SERPL-ACNC: <5 MIU/ML — SIGNIFICANT CHANGE UP
HCT VFR BLD CALC: 42.6 % — SIGNIFICANT CHANGE UP (ref 34.5–45)
HGB BLD-MCNC: 14 G/DL — SIGNIFICANT CHANGE UP (ref 11.5–15.5)
HYALINE CASTS # UR AUTO: 2 /LPF — SIGNIFICANT CHANGE UP (ref 0–7)
IANC: 3.84 K/UL — SIGNIFICANT CHANGE UP (ref 1.8–7.4)
IMM GRANULOCYTES NFR BLD AUTO: 0.4 % — SIGNIFICANT CHANGE UP (ref 0–1.5)
INR BLD: 1.25 RATIO — HIGH (ref 0.88–1.16)
KETONES UR-MCNC: NEGATIVE — SIGNIFICANT CHANGE UP
LEUKOCYTE ESTERASE UR-ACNC: NEGATIVE — SIGNIFICANT CHANGE UP
LYMPHOCYTES # BLD AUTO: 2.79 K/UL — SIGNIFICANT CHANGE UP (ref 1–3.3)
LYMPHOCYTES # BLD AUTO: 37.9 % — SIGNIFICANT CHANGE UP (ref 13–44)
MCHC RBC-ENTMCNC: 28.3 PG — SIGNIFICANT CHANGE UP (ref 27–34)
MCHC RBC-ENTMCNC: 32.9 GM/DL — SIGNIFICANT CHANGE UP (ref 32–36)
MCV RBC AUTO: 86.2 FL — SIGNIFICANT CHANGE UP (ref 80–100)
MONOCYTES # BLD AUTO: 0.63 K/UL — SIGNIFICANT CHANGE UP (ref 0–0.9)
MONOCYTES NFR BLD AUTO: 8.5 % — SIGNIFICANT CHANGE UP (ref 2–14)
NEUTROPHILS # BLD AUTO: 3.84 K/UL — SIGNIFICANT CHANGE UP (ref 1.8–7.4)
NEUTROPHILS NFR BLD AUTO: 52.2 % — SIGNIFICANT CHANGE UP (ref 43–77)
NITRITE UR-MCNC: NEGATIVE — SIGNIFICANT CHANGE UP
NRBC # BLD: 0 /100 WBCS — SIGNIFICANT CHANGE UP
NRBC # FLD: 0 K/UL — SIGNIFICANT CHANGE UP
PH UR: 6.5 — SIGNIFICANT CHANGE UP (ref 5–8)
PLATELET # BLD AUTO: 294 K/UL — SIGNIFICANT CHANGE UP (ref 150–400)
POTASSIUM SERPL-MCNC: 4.1 MMOL/L — SIGNIFICANT CHANGE UP (ref 3.5–5.3)
POTASSIUM SERPL-SCNC: 4.1 MMOL/L — SIGNIFICANT CHANGE UP (ref 3.5–5.3)
PROT SERPL-MCNC: 7.5 G/DL — SIGNIFICANT CHANGE UP (ref 6–8.3)
PROT UR-MCNC: ABNORMAL
PROTHROM AB SERPL-ACNC: 14.5 SEC — HIGH (ref 10.5–13.4)
RBC # BLD: 4.94 M/UL — SIGNIFICANT CHANGE UP (ref 3.8–5.2)
RBC # FLD: 14.4 % — SIGNIFICANT CHANGE UP (ref 10.3–14.5)
RBC CASTS # UR COMP ASSIST: 1 /HPF — SIGNIFICANT CHANGE UP (ref 0–4)
SODIUM SERPL-SCNC: 140 MMOL/L — SIGNIFICANT CHANGE UP (ref 135–145)
SP GR SPEC: >1.05 (ref 1–1.05)
UROBILINOGEN FLD QL: SIGNIFICANT CHANGE UP
WBC # BLD: 7.37 K/UL — SIGNIFICANT CHANGE UP (ref 3.8–10.5)
WBC # FLD AUTO: 7.37 K/UL — SIGNIFICANT CHANGE UP (ref 3.8–10.5)
WBC UR QL: 2 /HPF — SIGNIFICANT CHANGE UP (ref 0–5)

## 2022-03-29 PROCEDURE — 99285 EMERGENCY DEPT VISIT HI MDM: CPT

## 2022-03-29 PROCEDURE — 74177 CT ABD & PELVIS W/CONTRAST: CPT | Mod: 26,MA

## 2022-03-29 RX ORDER — SODIUM CHLORIDE 9 MG/ML
1000 INJECTION, SOLUTION INTRAVENOUS ONCE
Refills: 0 | Status: COMPLETED | OUTPATIENT
Start: 2022-03-29 | End: 2022-03-29

## 2022-03-29 RX ORDER — ACETAMINOPHEN 500 MG
1000 TABLET ORAL ONCE
Refills: 0 | Status: COMPLETED | OUTPATIENT
Start: 2022-03-29 | End: 2022-03-29

## 2022-03-29 RX ADMIN — SODIUM CHLORIDE 1000 MILLILITER(S): 9 INJECTION, SOLUTION INTRAVENOUS at 20:10

## 2022-03-29 RX ADMIN — Medication 400 MILLIGRAM(S): at 19:51

## 2022-03-29 NOTE — ED PROVIDER NOTE - OBJECTIVE STATEMENT
31F no PMH, no PSH presenting with 2 days of b/l worsening sharp lower abdominal pain, multiple episodes of watery diarrhea every hour from 9am in the morning to 10pm at night, 2 episodes of BRBPR, 2 episodes of NBNB vomiting, and inability to tolerate PO. Pt denies fever, chills, chest pain, SOB, cough. Reports 1 week of URI sx last week that have resolved. Denies recent travel, abx use, sick contacts, different meals recently.

## 2022-03-29 NOTE — ED PROVIDER NOTE - PROGRESS NOTE DETAILS
Giuliana Shah DO (PGY1): CT negative for acute pathology. Pt resting comfortably in bed. Pt pain mildly improved with meds. No episodes of vomiting or diarrhea in ED. Pt made aware of lab and imaging results. Questions regarding their symptoms were addressed. Pt states ready for dc. Advised to follow up with primary care doctor. Given strict return precautions. Pt verbalized understanding. DC after IVF finish. Giuliana Shah DO (PGY1): Pt gave urine sample, would not like to stay for fluids to finish or urine to result. Cell number: [phone]. Will call with abnormal results.

## 2022-03-29 NOTE — ED PROVIDER NOTE - ATTENDING CONTRIBUTION TO CARE
31F p/w watery diarrhea with BRBPR x 2 days.  Now c/o severe lower abd pain.  No vomiting today but happened yesterday.  Rx IV Tylenol, fluids, check labs, CT eval for colitis, reass.  Likely viral acute gastroenteritis.  If all acceptable OK for d/c home f/u PMD as long as able to walk OK and anna PO.  VS:  unremarkable    GEN - malaise, mild distress abd pain;   A+O x3   HEAD - NC/AT     ENT - PEERL, EOMI, mucous membranes  dry , no discharge      NECK: Neck supple, non-tender without lymphadenopathy, no masses, no JVD  PULM - CTA b/l,  symmetric breath sounds  COR -  normal heart sounds    ABD - , ND, lower abd ttp, soft,  BACK - no CVA tenderness, nontender spine     EXTREMS - no edema, no deformity, warm and well perfused    SKIN - no rash    or bruising      NEUROLOGIC - alert, face symmetric, speech fluent, sensation nl, motor no focal deficit.

## 2022-03-29 NOTE — ED PROVIDER NOTE - PHYSICAL EXAMINATION
GENERAL: Awake. Alert. NAD. Well nourished.  HEENT: NC/AT, Conjunctiva pink, no scleral icterus. Airway patent. Moist mucous membranes.  LUNGS: CTAB. No wheezes or rales noted.  CARDIAC: RRR. S1 and S2 intact. No murmurs noted.  ABDOMEN: No masses noted. Soft, ND, TTP at LLQ >RLQ, no rebound, no guarding.  EXT: No edema, no calf tenderness, distal pulses 2+ bilaterally,  NEURO: A&Ox3. Moving all extremities. Sensation and strength intact throughout.   SKIN: Warm and dry.  PSYCH: Normal affect.

## 2022-03-29 NOTE — ED ADULT NURSE NOTE - OBJECTIVE STATEMENT
Pt received to RM 10: A&Ox4, ambulatory, no significant PMH, c/o lower abdominal pain, nausea, difficulty tolerating PO, and a few episodes of BRBPR since yesterday. Respirations are even and unlabored, sating at 100% on RA, VSS, 20 G to R AC placed, labs sent, and medicated as ordered. Assessment ongoing.

## 2022-03-29 NOTE — ED PROVIDER NOTE - PATIENT PORTAL LINK FT
You can access the FollowMyHealth Patient Portal offered by [facility] by registering at the following website: [email or web address]. By joining [employer]’s FollowMyHealth portal, you will also be able to view your health information using other applications (apps) compatible with our system.

## 2022-03-29 NOTE — ED PROVIDER NOTE - NSFOLLOWUPINSTRUCTIONS_ED_ALL_ED_FT
You were seen in the emergency department for abdominal pain, diarrhea, vomiting.   Your lab results and imaging results are attached to this document.   You were given IV tylenol and IV fluids.   Please follow up with primary care doctor within 1 week.   For pain you can take acetaminophen according to the bottle instructions.   See attached information on diarrhea and abdominal pain.   Please return to ED for reasons listed below.   Continue to hydrate at home.    Diarrhea    Diarrhea is frequent loose or watery bowel movements that has many causes. Diarrhea can make you feel weak and cause you to become dehydrated. Diarrhea typically lasts 2–3 days, but can last longer if it is a sign of something more serious. Drink clear fluids to prevent dehydration. Eat bland, easy-to-digest foods as tolerated.     SEEK IMMEDIATE MEDICAL CARE IF YOU HAVE ANY OF THE FOLLOWING SYMPTOMS: high fevers, lightheadedness/dizziness, chest pain, black or bloody stools, shortness of breath, severe abdominal or back pain, or any signs of dehydration.     Abdominal Pain    Many things can cause abdominal pain. Many times, abdominal pain is not caused by a disease and will improve without treatment. Your health care provider will do a physical exam to determine if there is a dangerous cause of your pain; blood tests and imaging may help determine the cause of your pain. However, in many cases, no cause may be found and you may need further testing as an outpatient. Monitor your abdominal pain for any changes.     SEEK IMMEDIATE MEDICAL CARE IF YOU HAVE ANY OF THE FOLLOWING SYMPTOMS: worsening abdominal pain, uncontrollable vomiting, profuse diarrhea, inability to have bowel movements or pass gas, black or bloody stools, fever accompanying chest pain or back pain, or fainting. These symptoms may represent a serious problem that is an emergency. Do not wait to see if the symptoms will go away. Get medical help right away. Call 911 and do not drive yourself to the hospital.

## 2022-03-29 NOTE — ED ADULT TRIAGE NOTE - CHIEF COMPLAINT QUOTE
pt c/o lower abdominal pain since yesterday with difficulty tolerating PO, nausea, and BRBPR with bowel movements x few episodes yesterday.

## 2022-03-29 NOTE — ED PROVIDER NOTE - CLINICAL SUMMARY MEDICAL DECISION MAKING FREE TEXT BOX
31F no PMH, no PSH presenting with 2 days of b/l worsening sharp lower abdominal pain, multiple episodes of watery diarrhea every hour from 9am in the morning to 10pm at night, 2 episodes of BRBPR, 2 episodes of NBNB vomiting, and inability to tolerate PO. Given hx and physical, likely gastroenteritis vs infectious colitis but will eval for diverticulitis given LLQ TTP. Will eval for anemia and electrolyte abnormalities. Plan for rectal exam, labs, pain control, IVF, CT, reassess

## 2022-03-31 LAB
CULTURE RESULTS: SIGNIFICANT CHANGE UP
SPECIMEN SOURCE: SIGNIFICANT CHANGE UP

## 2022-08-19 ENCOUNTER — APPOINTMENT (OUTPATIENT)
Dept: ORTHOPEDIC SURGERY | Facility: CLINIC | Age: 32
End: 2022-08-19

## 2022-08-19 ENCOUNTER — NON-APPOINTMENT (OUTPATIENT)
Age: 32
End: 2022-08-19

## 2022-08-19 VITALS
HEIGHT: 63 IN | DIASTOLIC BLOOD PRESSURE: 78 MMHG | BODY MASS INDEX: 34.55 KG/M2 | WEIGHT: 195 LBS | HEART RATE: 77 BPM | SYSTOLIC BLOOD PRESSURE: 115 MMHG

## 2022-08-19 PROCEDURE — 73564 X-RAY EXAM KNEE 4 OR MORE: CPT | Mod: LT

## 2022-08-19 PROCEDURE — 99204 OFFICE O/P NEW MOD 45 MIN: CPT

## 2022-08-19 NOTE — HISTORY OF PRESENT ILLNESS
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 She was going down the stairs on 8/16/22 to get a patient trying to exit the building, left knee gave out on her but did not fall. She had a slight limp. The left knee was swollen. She rested and stayed home from work and noticed some improvement. Yesterday she returned to work and hopped off of an exam table and planted all of her weight on the left knee again, and felt it almost give out. She noticed her knee swell again which she iced with some relief. She experiences sharp and achy pain when flexing her knee and putting weight on the left leg. She has found some relief with ice and Tylenol. Denies numbness or tingling.

## 2022-08-19 NOTE — DISCUSSION/SUMMARY
[de-identified] : The patient had an acute injury to her left knee.  Her exam suggests possible torn lateral meniscus.  She also has symptoms at her patella tendon insertion.  She is fairly disabled by the pain and is unable to fully extend her knee.  She is referred for an MRI to evaluate for torn meniscus.  She is placed in a hinged knee brace for support.  She will return following the MRI.

## 2022-08-19 NOTE — PHYSICAL EXAM
[Slightly Antalgic] : slightly antalgic [LE] : Sensory: Intact in bilateral lower extremities [DP] : dorsalis pedis 2+ and symmetric bilaterally [PT] : posterior tibial 2+ and symmetric bilaterally [Normal] : Alert and in no acute distress [Poor Appearance] : well-appearing [Acute Distress] : not in acute distress [Obese] : not obese [de-identified] : The patient has no respiratory distress. Mood and affect are normal. The patient is alert and oriented to person, place and time.\par There is no pain with active or passive motion of the hips.  There is no tenderness of either hip.  Examination of the left knee demonstrates tenderness of the lateral joint line, patella, infrapatellar tendon and tibial tubercle.  There is no gross ligamentous instability.  Range of motion is limited secondary to pain with an arc of motion of 20 to 45 degrees.  The calves are soft and nontender.  The skin is intact.  There is no lymphedema. [de-identified] : AP, lateral, tunnel and sunrise x-rays of the left knee demonstrate no fracture, no dislocation and no bony abnormality.

## 2022-08-31 ENCOUNTER — APPOINTMENT (OUTPATIENT)
Dept: MRI IMAGING | Facility: IMAGING CENTER | Age: 32
End: 2022-08-31

## 2022-08-31 ENCOUNTER — OUTPATIENT (OUTPATIENT)
Dept: OUTPATIENT SERVICES | Facility: HOSPITAL | Age: 32
LOS: 1 days | End: 2022-08-31
Payer: COMMERCIAL

## 2022-08-31 DIAGNOSIS — S89.92XA UNSPECIFIED INJURY OF LEFT LOWER LEG, INITIAL ENCOUNTER: ICD-10-CM

## 2022-08-31 PROCEDURE — 73721 MRI JNT OF LWR EXTRE W/O DYE: CPT | Mod: 26,LT

## 2022-08-31 PROCEDURE — 73721 MRI JNT OF LWR EXTRE W/O DYE: CPT

## 2022-09-06 ENCOUNTER — NON-APPOINTMENT (OUTPATIENT)
Age: 32
End: 2022-09-06

## 2022-09-19 ENCOUNTER — APPOINTMENT (OUTPATIENT)
Dept: ORTHOPEDIC SURGERY | Facility: CLINIC | Age: 32
End: 2022-09-19

## 2022-09-23 ENCOUNTER — APPOINTMENT (OUTPATIENT)
Dept: ORTHOPEDIC SURGERY | Facility: CLINIC | Age: 32
End: 2022-09-23

## 2022-09-23 VITALS
HEART RATE: 87 BPM | BODY MASS INDEX: 34.55 KG/M2 | SYSTOLIC BLOOD PRESSURE: 125 MMHG | DIASTOLIC BLOOD PRESSURE: 82 MMHG | HEIGHT: 63 IN | WEIGHT: 195 LBS

## 2022-09-23 PROCEDURE — 99214 OFFICE O/P EST MOD 30 MIN: CPT

## 2022-09-23 PROCEDURE — 99072 ADDL SUPL MATRL&STAF TM PHE: CPT

## 2022-09-23 NOTE — PHYSICAL EXAM
[Slightly Antalgic] : slightly antalgic [LE] : Sensory: Intact in bilateral lower extremities [DP] : dorsalis pedis 2+ and symmetric bilaterally [PT] : posterior tibial 2+ and symmetric bilaterally [Normal] : Alert and in no acute distress [Poor Appearance] : well-appearing [Acute Distress] : not in acute distress [Obese] : not obese [de-identified] : The patient has no respiratory distress. Mood and affect are normal. The patient is alert and oriented to person, place and time.\par There is no pain with active or passive motion of the hips.  There is no tenderness of either hip.  Examination of the left knee demonstrates tenderness of the peripatellar region medially and laterally.  there is no gross ligamentous instability.  Range of motion is 0 to 100 degrees.  Nayan test is negative.  Calves are soft and nontender.  [de-identified] : EXAM: [id number] - MR KNEE LT - ORDERED BY: CECY HODGES\par \par \par PROCEDURE DATE: 08/31/2022\par \par \par \par INTERPRETATION: EXAMINATION: MRI of the left knee\par \par HISTORY: Left knee pain\par \par TECHNIQUE: Multiplanar, multisequential MR imaging was performed.\par \par FINDINGS:\par \par Fluid: Physiologic amount of knee joint fluid. No Baker's cyst.\par \par Ligaments: No tear of the cruciate or collateral ligaments.\par \par Medial Compartment: No medial meniscal tear. Preserved articular cartilage.\par \par Lateral Compartment: No lateral meniscal tear. Preserved articular cartilage.\par \par Patellofemoral Compartment: Preserved articular cartilage. There is edema in the superolateral aspect of Hoffa's fat pad, suggestive of a patellar tracking abnormality.\par \par Extensor Mechanism: No tear of the quadriceps or patellar tendons.\par \par Bones: No fracture or osteonecrosis.\par \par IMPRESSION: Edema in the superolateral aspect of Hoffa's fat pad, suggestive of a patellar tracking abnormality.\par \par \par QUE MOORE MD; Attending Radiologist\par This document has been electronically signed. Sep 2 2022 1:49PM\par

## 2022-09-23 NOTE — HISTORY OF PRESENT ILLNESS
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 She states her pain has improved since her last visit. She complains of intermittent sharp pains which she notices after being more active during the day. She has been wearing the brace as needed, takes Tylenol for pain with good relief. She is here to discuss the results of her MRI and further treatment. 
alert and oriented x 3/sensation intact/cranial nerves intact

## 2022-09-23 NOTE — DISCUSSION/SUMMARY
[de-identified] : The patient has no evidence of meniscal or ligamentous pathology.  She has had a patellofemoral injury.  She has had some improvement but has weakness and patella irritability.  She is referred for physical therapy.  She will be reevaluated in 4 weeks.

## 2022-09-23 NOTE — REASON FOR VISIT
[Follow-Up Visit] : a follow-up visit for [Workers' Comp: Date of Injury: _______] : This visit is related to worker's compensation. Date of Injury: [unfilled] [FreeTextEntry2] : left knee injury

## 2022-09-26 ENCOUNTER — NON-APPOINTMENT (OUTPATIENT)
Age: 32
End: 2022-09-26

## 2022-10-21 ENCOUNTER — APPOINTMENT (OUTPATIENT)
Dept: ORTHOPEDIC SURGERY | Facility: CLINIC | Age: 32
End: 2022-10-21

## 2022-10-21 VITALS
SYSTOLIC BLOOD PRESSURE: 135 MMHG | WEIGHT: 190 LBS | OXYGEN SATURATION: 98 % | TEMPERATURE: 98.7 F | HEART RATE: 90 BPM | HEIGHT: 63 IN | BODY MASS INDEX: 33.66 KG/M2 | DIASTOLIC BLOOD PRESSURE: 83 MMHG

## 2022-10-21 DIAGNOSIS — S89.92XA UNSPECIFIED INJURY OF LEFT LOWER LEG, INITIAL ENCOUNTER: ICD-10-CM

## 2022-10-21 PROCEDURE — 99072 ADDL SUPL MATRL&STAF TM PHE: CPT

## 2022-10-21 PROCEDURE — 99213 OFFICE O/P EST LOW 20 MIN: CPT

## 2022-10-21 NOTE — PHYSICAL EXAM
[Slightly Antalgic] : slightly antalgic [LE] : Sensory: Intact in bilateral lower extremities [DP] : dorsalis pedis 2+ and symmetric bilaterally [PT] : posterior tibial 2+ and symmetric bilaterally [Normal] : Alert and in no acute distress [Poor Appearance] : well-appearing [Acute Distress] : not in acute distress [Obese] : not obese [de-identified] : The patient has no respiratory distress. Mood and affect are normal. The patient is alert and oriented to person, place and time.\par There is no pain with active or passive motion of the hips.  There is no tenderness of either hip.  Examination of the left knee demonstrates tenderness of the peripatellar region medially and laterally.  there is no gross ligamentous instability.  She still has quadriceps weakness.  range of motion is 0 to 110 degrees.  Nayan test is negative.  Calves are soft and nontender.

## 2022-10-21 NOTE — HISTORY OF PRESENT ILLNESS
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 She states her pain has improved greatly since her last visit. She complains of intermittent mild pain which she notices after being more active during the day, but this does not inhibit her nearly as much. She has been wearing the brace as needed, only if she is to be walking on it for a while. She takes Tylenol for pain PRN with good relief. She was indicated for physical therapy last office visit, which she has done 3x/week, which has helped tremendously.

## 2022-10-21 NOTE — DISCUSSION/SUMMARY
[de-identified] : The patient's left knee is improving with physical therapy.  She still has some weakness.  She would benefit from continued physical therapy.  She will be reevaluated in 4 weeks.

## 2022-10-28 ENCOUNTER — NON-APPOINTMENT (OUTPATIENT)
Age: 32
End: 2022-10-28

## 2022-11-14 ENCOUNTER — APPOINTMENT (OUTPATIENT)
Dept: ORTHOPEDIC SURGERY | Facility: CLINIC | Age: 32
End: 2022-11-14

## 2023-02-28 ENCOUNTER — INPATIENT (INPATIENT)
Facility: HOSPITAL | Age: 33
LOS: 5 days | Discharge: HOME | End: 2023-03-06
Attending: STUDENT IN AN ORGANIZED HEALTH CARE EDUCATION/TRAINING PROGRAM | Admitting: STUDENT IN AN ORGANIZED HEALTH CARE EDUCATION/TRAINING PROGRAM
Payer: COMMERCIAL

## 2023-02-28 VITALS — WEIGHT: 199.52 LBS

## 2023-02-28 DIAGNOSIS — R47.01 APHASIA: ICD-10-CM

## 2023-02-28 LAB
ALBUMIN SERPL ELPH-MCNC: 4.1 G/DL — SIGNIFICANT CHANGE UP (ref 3.3–5)
ALP SERPL-CCNC: 31 U/L — LOW (ref 40–120)
ALT FLD-CCNC: 11 U/L — SIGNIFICANT CHANGE UP (ref 4–33)
ANION GAP SERPL CALC-SCNC: 7 MMOL/L — SIGNIFICANT CHANGE UP (ref 7–14)
APTT BLD: 30.7 SEC — SIGNIFICANT CHANGE UP (ref 27–36.3)
AST SERPL-CCNC: 12 U/L — SIGNIFICANT CHANGE UP (ref 4–32)
BASOPHILS # BLD AUTO: 0.03 K/UL — SIGNIFICANT CHANGE UP (ref 0–0.2)
BASOPHILS NFR BLD AUTO: 0.4 % — SIGNIFICANT CHANGE UP (ref 0–2)
BILIRUB SERPL-MCNC: <0.2 MG/DL — SIGNIFICANT CHANGE UP (ref 0.2–1.2)
BUN SERPL-MCNC: 12 MG/DL — SIGNIFICANT CHANGE UP (ref 7–23)
CALCIUM SERPL-MCNC: 9 MG/DL — SIGNIFICANT CHANGE UP (ref 8.4–10.5)
CHLORIDE SERPL-SCNC: 108 MMOL/L — HIGH (ref 98–107)
CO2 SERPL-SCNC: 24 MMOL/L — SIGNIFICANT CHANGE UP (ref 22–31)
CREAT SERPL-MCNC: 0.78 MG/DL — SIGNIFICANT CHANGE UP (ref 0.5–1.3)
EGFR: 105 ML/MIN/1.73M2 — SIGNIFICANT CHANGE UP
EOSINOPHIL # BLD AUTO: 0.1 K/UL — SIGNIFICANT CHANGE UP (ref 0–0.5)
EOSINOPHIL NFR BLD AUTO: 1.5 % — SIGNIFICANT CHANGE UP (ref 0–6)
GLUCOSE SERPL-MCNC: 95 MG/DL — SIGNIFICANT CHANGE UP (ref 70–99)
HCT VFR BLD CALC: 39.5 % — SIGNIFICANT CHANGE UP (ref 34.5–45)
HGB BLD-MCNC: 12.7 G/DL — SIGNIFICANT CHANGE UP (ref 11.5–15.5)
IANC: 2.88 K/UL — SIGNIFICANT CHANGE UP (ref 1.8–7.4)
IMM GRANULOCYTES NFR BLD AUTO: 0.1 % — SIGNIFICANT CHANGE UP (ref 0–0.9)
INR BLD: 1.14 RATIO — SIGNIFICANT CHANGE UP (ref 0.88–1.16)
LYMPHOCYTES # BLD AUTO: 2.93 K/UL — SIGNIFICANT CHANGE UP (ref 1–3.3)
LYMPHOCYTES # BLD AUTO: 43.5 % — SIGNIFICANT CHANGE UP (ref 13–44)
MCHC RBC-ENTMCNC: 27.4 PG — SIGNIFICANT CHANGE UP (ref 27–34)
MCHC RBC-ENTMCNC: 32.2 GM/DL — SIGNIFICANT CHANGE UP (ref 32–36)
MCV RBC AUTO: 85.1 FL — SIGNIFICANT CHANGE UP (ref 80–100)
MONOCYTES # BLD AUTO: 0.78 K/UL — SIGNIFICANT CHANGE UP (ref 0–0.9)
MONOCYTES NFR BLD AUTO: 11.6 % — SIGNIFICANT CHANGE UP (ref 2–14)
NEUTROPHILS # BLD AUTO: 2.88 K/UL — SIGNIFICANT CHANGE UP (ref 1.8–7.4)
NEUTROPHILS NFR BLD AUTO: 42.9 % — LOW (ref 43–77)
NRBC # BLD: 0 /100 WBCS — SIGNIFICANT CHANGE UP (ref 0–0)
NRBC # FLD: 0 K/UL — SIGNIFICANT CHANGE UP (ref 0–0)
PLATELET # BLD AUTO: 289 K/UL — SIGNIFICANT CHANGE UP (ref 150–400)
POTASSIUM SERPL-MCNC: 3.9 MMOL/L — SIGNIFICANT CHANGE UP (ref 3.5–5.3)
POTASSIUM SERPL-SCNC: 3.9 MMOL/L — SIGNIFICANT CHANGE UP (ref 3.5–5.3)
PROT SERPL-MCNC: 7.4 G/DL — SIGNIFICANT CHANGE UP (ref 6–8.3)
PROTHROM AB SERPL-ACNC: 13.2 SEC — SIGNIFICANT CHANGE UP (ref 10.5–13.4)
RBC # BLD: 4.64 M/UL — SIGNIFICANT CHANGE UP (ref 3.8–5.2)
RBC # FLD: 14.6 % — HIGH (ref 10.3–14.5)
SARS-COV-2 RNA SPEC QL NAA+PROBE: SIGNIFICANT CHANGE UP
SODIUM SERPL-SCNC: 139 MMOL/L — SIGNIFICANT CHANGE UP (ref 135–145)
TROPONIN T, HIGH SENSITIVITY RESULT: <6 NG/L — SIGNIFICANT CHANGE UP
WBC # BLD: 6.73 K/UL — SIGNIFICANT CHANGE UP (ref 3.8–10.5)
WBC # FLD AUTO: 6.73 K/UL — SIGNIFICANT CHANGE UP (ref 3.8–10.5)

## 2023-02-28 PROCEDURE — 70498 CT ANGIOGRAPHY NECK: CPT | Mod: 26,MA

## 2023-02-28 PROCEDURE — 0042T: CPT | Mod: MA

## 2023-02-28 PROCEDURE — 70496 CT ANGIOGRAPHY HEAD: CPT | Mod: 26,MA

## 2023-02-28 PROCEDURE — 99291 CRITICAL CARE FIRST HOUR: CPT

## 2023-02-28 RX ORDER — METOCLOPRAMIDE HCL 10 MG
10 TABLET ORAL ONCE
Refills: 0 | Status: COMPLETED | OUTPATIENT
Start: 2023-02-28 | End: 2023-02-28

## 2023-02-28 RX ORDER — SODIUM CHLORIDE 9 MG/ML
10 INJECTION INTRAMUSCULAR; INTRAVENOUS; SUBCUTANEOUS ONCE
Refills: 0 | Status: COMPLETED | OUTPATIENT
Start: 2023-02-28 | End: 2023-02-28

## 2023-02-28 RX ORDER — ACETAMINOPHEN 500 MG
1000 TABLET ORAL ONCE
Refills: 0 | Status: COMPLETED | OUTPATIENT
Start: 2023-02-28 | End: 2023-02-28

## 2023-02-28 RX ORDER — TENECTEPLASE 50 MG
23 KIT INTRAVENOUS ONCE
Refills: 0 | Status: COMPLETED | OUTPATIENT
Start: 2023-02-28 | End: 2023-02-28

## 2023-02-28 RX ADMIN — TENECTEPLASE 3312 MILLIGRAM(S): KIT at 17:12

## 2023-02-28 RX ADMIN — SODIUM CHLORIDE 10 MILLILITER(S): 9 INJECTION INTRAMUSCULAR; INTRAVENOUS; SUBCUTANEOUS at 17:12

## 2023-02-28 RX ADMIN — Medication 10 MILLIGRAM(S): at 17:25

## 2023-02-28 RX ADMIN — Medication 400 MILLIGRAM(S): at 18:20

## 2023-02-28 NOTE — ED PROVIDER NOTE - PHYSICAL EXAMINATION
General: well appearing, alert, oriented to person, time, place  Psych: mood appropriate  Head: normocephalic; atraumatic  Eyes: conjunctivae clear bilaterally, sclerae anicteric  ENT: no nasal flaring, patent nares  Cardio: RRR, no m/r/g, pulses 2+ b/l  Resp: CATB, no w/r/r  GI: soft/nondistended/nontender  : no CVA tenderness  Neuro: patient expressively aphasic; strength 5/5 in RIGHT upper and lower extremities; strength 1/5 in LEFT upper and lower extremities  Skin: No evidence of rash or bruising  MSK: normal movement of all extremities  Lymph/Vasc: no LE edema

## 2023-02-28 NOTE — H&P ADULT - ASSESSMENT
32F w/o known PMH presenting with AMS. Code Stroke initiated and tnk pushed. Pt transferred to MICU for closer monitoring.     =======NEURO=================  #Stroke  -BL A&O x 4 w/o focal deficits  -AMS on 2/28 at ~16:30  -CT Brain perfusion w/o acute pathology  -pushed tnk 2/28 at ~17:12  > f/u neuro recs  > neuro checks q15 for 4h then q1h for 24h, then q4h thereafter  > r/p CTH at 24 hrs to assess stability, TTE, MRI  > Aspirin 81 mg daily will be started in 24h if rCTH negative for hemorrhage  > Pharmacologic DVT prophylaxis started in 24h if rCTH negative for hemorrhage.  > keep BP < 180/105, gradual normotension over   > Atorvastatin 40 mg daily (long-term goal and titrate to LDL < 70)  > CBC, CMP, Troponin, Coags, Lipid Panel, HgA1C  > aspiration/fall precautions  > Fall precautions   > PT/OT  > S/S 12 hrs post-tpa    =======CARDIOVASCULAR=========  #Stable  > f/u TTE    =======RESPIRATORY============  #Stable  > on 2L NC    =======GASTROINTESTINAL=======  #NPO   > s/s 12 hrs post tnk  =======GENITOURINARY==========  #Stable  > no ramirez for 24 hrs post tnk    =======INFECTIOUS DISEASE=======  #Stable  - ctm    =======HEME===================  #DVT  > SCDs    =======ENDOCRINE==============  #Stable  > A1c     =======PROPHYLAXIS=============  -DVT: SCDs   -Code: Full  -Dispo: neuro check q1  32F w/o known PMH presenting with AMS. Code Stroke initiated and tnk pushed. Pt transferred to MICU for closer monitoring.     =======NEURO=================  #Stroke  #r/o migraine  -BL A&O x 4 w/o focal deficits  -AMS on 2/28 at ~16:30  -CT Brain perfusion w/o acute pathology  -pushed tnk 2/28 at ~17:12  > f/u neuro recs  > neuro checks q15 for 4h then q1h for 24h, then q4h thereafter  > r/p CTH at 24 hrs to assess stability, TTE, MRI     > Aspirin 81 mg daily will be started in 24h if rCTH negative for hemorrhage    > Pharmacologic DVT prophylaxis started in 24h if rCTH negative for hemorrhage.  > keep BP < 180/105, gradual normotension over   > Atorvastatin 40 mg daily (long-term goal and titrate to LDL < 70)  > CBC, CMP, Troponin, Coags, Lipid Panel, HgA1C  > aspiration/fall precautions  > PT/OT  > S/S 12 hrs post-tpa    =======CARDIOVASCULAR=========  #Stable  > f/u TTE    =======RESPIRATORY============  #Stable  > on 2L NC    =======GASTROINTESTINAL=======  #NPO   > s/s 12 hrs post tnk    =======GENITOURINARY==========  #Stable  > no ramirez for 24 hrs post tnk    =======INFECTIOUS DISEASE=======  #Stable  - ctm    =======HEME===================  #DVT  > SCDs    =======ENDOCRINE==============  #Stable  > A1c     =======PROPHYLAXIS=============  -DVT: SCDs   -Code: Full  -Dispo: neuro check q1

## 2023-02-28 NOTE — H&P ADULT - NSHPPHYSICALEXAM_GEN_ALL_CORE
GENERAL: NAD. Well-developed.  NEUROLOGICAL: _____________________________  HEAD: Atraumatic, normocephalic,   EYES: EOMI, PERRLA, No conjunctival or scleral injection.  NASAL/ORAL: Oral mucosa with moist membranes. Normal dentition. No pharyngeal injection or exudates.                    NECK: No lymphadenopathy. Supple, symmetric and without tracheal deviation.   CARDIAC: RRR w/ normal S1 & S2. No murmurs, rubs. & gallops. Radial & dorsal pedis pulses intact. No carotid bruits.   PULMONARY: CTA b/l w/o accessory muscle use.   GI: Soft, NT, ND, no rebound, no guarding. NABS in 4 quads. No palpable masses. No hepatosplenomegaly. No hernia visualized. No excessive scarring. Negative lei , psoas, obturator signs.   RENAL: No lower extremity edema. No CVA tenderness.   MSK/DERM:  Examination of the (head/neck/spine/ribs/pelvis, RUE, LUE, RLE, LLE) without misalignment.  Normal ROM without pain, no spinal tenderness, normal muscle strength/tone. No rashes or ulcers noted; no subcutaneous nodules or induration palpable  PSYCH: Appropriate insight/judgment GENERAL: NAD. Well-developed.  NEUROLOGICAL:A&O x 4; CN2-12 grossly intact, noted difficulty w/ speech; 3-4/5 strength on left UE, 2-3/5 strength on left LE; 5/5 strength on RUE/RUE; sensation intact on both side  HEAD: Atraumatic, normocephalic,   EYES: EOMI, PERRLA, No conjunctival or scleral injection.  NASAL/ORAL: Oral mucosa with moist membranes. Normal dentition. No pharyngeal injection or exudates.                    NECK: No lymphadenopathy. Supple, symmetric and without tracheal deviation.   CARDIAC: RRR w/ normal S1 & S2. No murmurs, rubs. & gallops. Radial & dorsal pedis pulses intact. No carotid bruits.   PULMONARY: CTA b/l w/o accessory muscle use.   GI: Soft, NT, ND, no rebound, no guarding. NABS in 4 quads. No palpable masses. No hepatosplenomegaly. No hernia visualized. No excessive scarring. Negative lei , psoas, obturator signs.   RENAL: No lower extremity edema. No CVA tenderness.   MSK/DERM:  Examination of the (head/neck/spine/ribs/pelvis, RUE, LUE, RLE, LLE) without misalignment.  Normal ROM without pain, no spinal tenderness, normal muscle strength/tone. No rashes or ulcers noted; no subcutaneous nodules or induration palpable  PSYCH: Appropriate insight/judgment

## 2023-02-28 NOTE — CONSULT NOTE ADULT - SUBJECTIVE AND OBJECTIVE BOX
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 Around 16:15 she began experiencing sudden onset word finding difficulty to the point of being almost completely unable to speak. She was among staff on 9S who noticed this sudden change. RRT was called for evaluation. Rapid team deemed this to be an acute neurologic deficit so patient was rapidly transferred down to the ED's ambulance bay for a code stroke to be called at 16:47. Patient only endorsing HA & difficulty speaking and only able to communicate through nodding/shaking head "no," or fragmentary language. She denies a episode like this in the past. Mother was called on phone as collateral, discussed current presentation which mother agrees has never happened before but she does have a history of migraine. Absolute contraindications to tenecteplase were ruled out by history with mother & patient. Risks/benefits discussed with mother who understood and was consenting to receive thrombolytic treatment. At baseline patient is ambulatory and independent with ADLs. Patient denies possibility of pregnancy and reported that her menstrual period was recently but unable to state when.     REVIEW OF SYSTEMS    A 10-system ROS was performed and is negative except for those items noted above and/or in the HPI.    PAST MEDICAL & SURGICAL HISTORY:  Migraine      No significant past surgical history      No significant past surgical history        FAMILY HISTORY:    SOCIAL HISTORY:   T/E/D:   Occupation:   Lives with:     MEDICATIONS (HOME):  Home Medications:    MEDICATIONS  (STANDING):    MEDICATIONS  (PRN):    ALLERGIES/INTOLERANCES:  Allergies  No Known Allergies    Intolerances    VITALS & EXAMINATION:  Vital Signs Last 24 Hrs  T(C): 37.2 (28 Feb 2023 17:35), Max: 37.2 (28 Feb 2023 17:12)  T(F): 99 (28 Feb 2023 17:35), Max: 99 (28 Feb 2023 17:12)  HR: 85 (28 Feb 2023 17:35) (85 - 103)  BP: 122/89 (28 Feb 2023 17:35) (122/89 - 133/88)  BP(mean): 102 (28 Feb 2023 17:12) (102 - 102)  RR: 23 (28 Feb 2023 17:35) (23 - 23)  SpO2: 100% (28 Feb 2023 17:35) (100% - 100%)    Parameters below as of 28 Feb 2023 17:35  Patient On (Oxygen Delivery Method): room air        General:  Constitutional: Female, appears stated age, in no apparent distress including pain  Head: Normocephalic & Atraumatic.  Respiratory: Breathing comfortably.    Neurological:  MS: Eyes open. Awake, alert, oriented to person, place, situation, time. Follows all commands, including cross commands.     Language: Speech is at least moderately dysarthric, fragmentary, severely dysfluent with impaired repetition.     CNs: VFF. EOMI no nystagmus. V1-3 intact to LT b/l. No facial asymmetry b/l, full eye closure strength b/l.     Motor: Normal muscle bulk & tone. No noticeable tremor. No effort against gravity of LUE/LLE; on reassessment after tenecteplase administration, LUE w/ drift & LLE with minimal effort against gravity.              Deltoid	Biceps	Triceps	   R	         5	     5	    5	 5	  		 	  L	         2	     3	    3 	 3	  		    	H-Flex	K-Flex	K-Ext	D-Flex	P-Flex  R	    5	     5	   5	    5	     5		   L	    2	    2	  2	     3	     3		     Sensation: Intact to LT b/l throughout.     Cortical: Extinction on DSS (neglect): none    Coordination: Unable to assess FTN w/ L side given weakness.     Gait: Deferred.     LABORATORY:  CBC                       12.7   6.73  )-----------( 289      ( 28 Feb 2023 17:07 )             39.5     Chem 02-28    139  |  108<H>  |  12  ----------------------------<  95  3.9   |  24  |  0.78    Ca    9.0      28 Feb 2023 17:07    TPro  7.4  /  Alb  4.1  /  TBili  <0.2  /  DBili  x   /  AST  12  /  ALT  11  /  AlkPhos  31<L>  02-28    LFTs LIVER FUNCTIONS - ( 28 Feb 2023 17:07 )  Alb: 4.1 g/dL / Pro: 7.4 g/dL / ALK PHOS: 31 U/L / ALT: 11 U/L / AST: 12 U/L / GGT: x           Coagulopathy PT/INR - ( 28 Feb 2023 17:07 )   PT: 13.2 sec;   INR: 1.14 ratio         PTT - ( 28 Feb 2023 17:07 )  PTT:30.7 sec  Lipid Panel   A1c   Cardiac enzymes     U/A   CSF  Immunological  Other    STUDIES & IMAGING:  Studies (EKG, EEG, EMG, etc):     Radiology (XR, CT, MR, U/S, TTE/BRYNN):      Brain CT:    No acute hemorrhage, hydrocephalus, midline shift or extra-axial   collections are identified. The orbits are not remarkable in appearance.    The visualized paranasal sinuses and tympanomastoid cavities are free of   acute disease.    Neck CTA:     The aorta and great vessels are patent.    Both vertebral arteries and their origins are well visualized. There is   left vertebral arterial dominance.    The common carotid, internal carotid and external carotid arteries are   patent.    Brain CTA:    No large vessel occlusion or stenosis is identified. The anterior   communicating artery is not visualized. 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 No vascular aneurysm is visualized. No abnormal   vessels are seen.    Perfusion maps:    CBF less than 30%: 0 mL  Tmax greater than 6 seconds: 0 mL  Mismatch volume 0 mL.    There is no evidence for core infarct or penumbra.    IMPRESSION:    Brain CT: No acute hemorrhage, mass effect, hydrocephalus or extra-axial   collections.    Neck CTA: No hemodynamically significant stenosis.    Brain CTA: No large vessel occlusion or stenosis.    Perfusion maps: No evidence for core infarct or area of brain at risk.    Other results of the brain CT were discussed with Dr. Avina at L5 over   4:00 PM on 2/28/2023.    The results of the head and neck CTA and perfusion maps were discussed   with Dr. Avina at 5:22 PM on 2/28/2023    --- End of Report ---

## 2023-02-28 NOTE — CONSULT NOTE ADULT - ASSESSMENT
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 Around 16:15 she began experiencing sudden onset word finding difficulty to the point of being almost completely unable to speak. She was among staff on 9S who noticed this sudden change. RRT was called for evaluation. Rapid team deemed this to be a neurologic deficit so patient was taken to ambulance bay for a code stroke to be called. Patient only endorsing HA & difficulty speaking and only able to communicate through nodding/shaking head "no," or fragmentary language. Mother was called on phone as collateral, discussed current presentation which mother agrees has never happened before but she does have a history of migraine. Exam as above, improved after thrombolytic administration. CTH/CTA/CTP negative for acute findings.     NIHSS: 10   mRS: 0     Patient was a thrombolytic candidate as she was within 4.5 hour time window, without absolute contraindications, and with a disabling deficit on exam. Risks/benefits were discussed with patient and mother, over the phone, who understood and consented to treatment. In addition, patient denied possibility of pregnancy but it was explained that if pregnant, tenecteplase could potentially cause harm to a gestation. ED provider aware of tenecteplase consideration/administration & agreed. Tenecteplase administered at 17:13 in presence of ED staff & pharmacy with verbal time out; patient weight 90.5 kG, dose of tenecteplase calculated at 0.25 mg/kg to equal 23 mG. Volume of 4.6 mL.     Patient is not a mechanical thrombectomy candidate because no evidence of LVO.    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 In the differential is acute ischemic stroke of unknown mechanism vs migraine with first episode of aura.     Recommendations     Imaging:  [] rCTH at 24 hours to assess for stability  [] MRI brain w/o contrast to evaluate for acute ischemic stroke  [] TTE  [] Consider ILR  [] Baseline EKG & CXR    Meds:  [] Cardene drip PRN to keep BP < 180/105  [] Aspirin 81 mg daily will be started in 24h if rCTH negative for hemorrhage.   [] Atorvastatin 40 mg daily (long-term goal and titrate to LDL < 70)  [] Pharmacologic DVT prophylaxis started in 24h if rCTH negative for hemorrhage.    Labs:  [] CBC, CMP, Troponin, Coags  [] Lipid Panel  [] HgA1C    Other:  [] MICU Consult   [] Frequent neuro-checks (q15 for 4h then q1h for 24h, then q4h thereafter)  [] BP checks: q15min for first 2h, then q30 for 6h, then hourly until 24h.  [] Permissive HTN up to /105 for 24 hours then gradual normotension over 2-3 days.   [] Telemetry   [] DVT prophylaxis with SCDs until cleared for pharmacological prophylaxis.  [] No ramirez catheter removal or placement x 24h. No venous/arterial puncture at non-compressible site.  [] Nothing by mouth (NPO) including oral medication pending bedside Dysphagia Screen or  Speech Language Pathologist evaluation (may be done in ED upon presentation even at time  of IV tenecteplase administration) and for 6 hours post IV tenecteplase administration (If  dysphagia screen is passed, then NPO except for medications during these first 6 hours)  [] Head of bed > 30 degrees for aspiration prevention  [] Aspiration Precautions  [] Fall Precautions  [] PT/OT  [] S/S eval at least 12 hours post tpa    Patient case discussed with stroke fellow, Dr. Dumont, under supervision of stroke attending, Dr. Dominique. Patient to be seen on morning rounds.     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 Around 16:15 she began experiencing sudden onset word finding difficulty to the point of being almost completely unable to speak. She was among staff on 9S who noticed this sudden change. RRT was called for evaluation. Rapid team deemed this to be a neurologic deficit so patient was taken to ambulance bay for a code stroke to be called. Patient only endorsing HA & difficulty speaking and only able to communicate through nodding/shaking head "no," or fragmentary language. Mother was called on phone as collateral, discussed current presentation which mother agrees has never happened before but she does have a history of migraine. Exam as above, improved after thrombolytic administration. CTH/CTA/CTP negative for acute findings.     NIHSS: 10   mRS: 0     Patient was a thrombolytic candidate as she was within 4.5 hour time window, without absolute contraindications, and with a disabling deficit on exam. Risks/benefits were discussed with patient and mother, over the phone, who understood and consented to treatment. In addition, patient denied possibility of pregnancy but it was explained that if pregnant, tenecteplase could potentially cause harm to a gestation. ED provider aware of tenecteplase consideration/administration & agreed. Tenecteplase administered at 17:13 in presence of ED staff & pharmacy with verbal time out; patient weight 90.5 kG, dose of tenecteplase calculated at 0.25 mg/kg to equal 23 mG. Volume of 4.6 mL.     Patient is not a mechanical thrombectomy candidate because no evidence of LVO.    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 In the differential is acute ischemic stroke of unknown mechanism vs migraine with first episode of aura.     Recommendations     Imaging:  [] rCTH at 24 hours to assess for stability  [] MRI brain w/o contrast to evaluate for acute ischemic stroke  [] TTE  [] Baseline EKG & CXR    Meds:  [] Cardene drip PRN to keep BP < 180/105  [] Aspirin 81 mg daily will be started in 24h if rCTH negative for hemorrhage.   [] Atorvastatin 40 mg daily (long-term goal and titrate to LDL < 70)  [] Pharmacologic DVT prophylaxis started in 24h if rCTH negative for hemorrhage.    Labs:  [] CBC, CMP, Troponin, Coags  [] Lipid Panel  [] HgA1C    Other:  [] MICU Consult   [] Frequent neuro-checks (q15 for 4h then q1h for 24h, then q4h thereafter)  [] BP checks: q15min for first 2h, then q30 for 6h, then hourly until 24h.  [] Permissive HTN up to /105 for 24 hours then gradual normotension over 2-3 days.   [] Telemetry   [] DVT prophylaxis with SCDs until cleared for pharmacological prophylaxis.  [] No ramirez catheter removal or placement x 24h. No venous/arterial puncture at non-compressible site.  [] Nothing by mouth (NPO) including oral medication pending bedside Dysphagia Screen or  Speech Language Pathologist evaluation (may be done in ED upon presentation even at time  of IV tenecteplase administration) and for 6 hours post IV tenecteplase administration (If  dysphagia screen is passed, then NPO except for medications during these first 6 hours)  [] Head of bed > 30 degrees for aspiration prevention  [] Aspiration Precautions  [] Fall Precautions  [] PT/OT  [] S/S eval at least 12 hours post tpa    Patient case discussed with stroke fellow, Dr. Dumont, under supervision of stroke attending, Dr. Dominique. Patient to be seen on morning rounds.     Please call with questions: [phone]

## 2023-02-28 NOTE — PATIENT PROFILE ADULT - FALL HARM RISK - RISK INTERVENTIONS
Assistance OOB with selected safe patient handling equipment/Assistance with ambulation/Communicate Fall Risk and Risk Factors to all staff, patient, and family/Discuss with provider need for PT consult/Monitor gait and stability/Reinforce activity limits and safety measures with patient and family/Visual Cue: Yellow wristband/Bed in lowest position, wheels locked, appropriate side rails in place/Call bell, personal items and telephone in reach/Instruct patient to call for assistance before getting out of bed or chair/Non-slip footwear when patient is out of bed/[place] to call system/Physically safe environment - no spills, clutter or unnecessary equipment/Purposeful Proactive Rounding/Room/bathroom lighting operational, light cord in reach

## 2023-02-28 NOTE — ED ADULT NURSE NOTE - NSIMPLEMENTINTERV_GEN_ALL_ED
Implemented All Fall Risk Interventions:  [place] to call system. Call bell, personal items and telephone within reach. Instruct patient to call for assistance. Room bathroom lighting operational. Non-slip footwear when patient is off stretcher. Physically safe environment: no spills, clutter or unnecessary equipment. Stretcher in lowest position, wheels locked, appropriate side rails in place. Provide visual cue, wrist band, yellow gown, etc. Monitor gait and stability. Monitor for mental status changes and reorient to person, place, and time. Review medications for side effects contributing to fall risk. Reinforce activity limits and safety measures with patient and family.

## 2023-02-28 NOTE — H&P ADULT - NSHPLABSRESULTS_GEN_ALL_CORE
02-28    139  |  108<H>  |  12  ----------------------------<  95  3.9   |  24  |  0.78    Ca    9.0      28 Feb 2023 17:07    TPro  7.4  /  Alb  4.1  /  TBili  <0.2  /  DBili  x   /  AST  12  /  ALT  11  /  AlkPhos  31<L>  02-28          PT/INR - ( 28 Feb 2023 17:07 )   PT: 13.2 sec;   INR: 1.14 ratio         PTT - ( 28 Feb 2023 17:07 )  PTT:30.7 sec                                        12.7   6.73  )-----------( 289      ( 28 Feb 2023 17:07 )             39.5     CAPILLARY BLOOD GLUCOSE

## 2023-02-28 NOTE — PHARMACOTHERAPY INTERVENTION NOTE - COMMENTS
Tenecteplase reconstituted and prepared by me, verified with RAKESH Avina. Patient weight 90.5 kG, dose of tenecteplase calculated at 0.25 mg/kg to equal 23 mG. Volume drawn up is 4.6 mL.  Tenecteplase reconstituted and prepared by me, verified with RAKESH Avina. Patient weight 90.5 kG, dose of tenecteplase calculated at 0.25 mg/kg to equal 23 mG. Volume of 4.6 mL drawn up for administration.

## 2023-02-28 NOTE — ED ADULT TRIAGE NOTE - CHIEF COMPLAINT QUOTE
Pt brought to ambulance bay as a rapid for difficulty speaking. FS 92. Pt brought directly to CT scan, code stroke called. unable to obtain vitals

## 2023-02-28 NOTE — PATIENT PROFILE ADULT - FUNCTIONAL ASSESSMENT - BASIC MOBILITY 1.
Addended by: ERIS GARCIA on: 2/23/2022 10:59 AM     Modules accepted: Level of Service    
4 = No assist / stand by assistance

## 2023-02-28 NOTE — ED PROVIDER NOTE - OBJECTIVE STATEMENT
32-year-old female with past medical history of migraines brought into the emergency department and upgraded to a code stroke after a medical rapid response.  She had asked a nurse on one of the medical floors to check her blood pressure because she had a headache.  When the nurse went in to see her, he found her lying unresponsive and unable to speak.  Her last known normal was 4:20 PM.  Patient is unresponsive and unable to provide additional history.

## 2023-02-28 NOTE — H&P ADULT - HISTORY OF PRESENT ILLNESS
32F w/ PMH of migraines (unassociated w/ auras) admitted for AMS. 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 Around 16:15 she began experiencing sudden onset word finding difficulty to the point of being almost completely unable to speak. RRT was called for evaluation. Rapidly transferred down to the ED's ambulance bay for a code stroke to be called at 16:47. Patient only endorsing HA & difficulty speaking and only able to communicate through nodding/shaking head "no," or fragmentary language. She denies a episode like this in the past. Mother was called on phone as collateral, discussed current presentation which mother agrees has never happened before but she does have a history of migraine. Absolute contraindications to tenecteplase were ruled out by history with mother & patient. Risks/benefits discussed with mother who understood and was consenting to receive thrombolytic treatment. Patient denies possibility of pregnancy and reported that her menstrual period was recently but unable to state when.     ED relevant for HDS & afebrile. CBC & CMP w/o acute pathology. Brain CT: No acute hemorrhage, mass effect, hydrocephalus or extra-axial collections. Neck CTA: No hemodynamically significant stenosis. Brain CTA: No large vessel occlusion or stenosis. Received tnk at 17:12. Transferred to MICU for closer monitoring.

## 2023-02-28 NOTE — ED ADULT NURSE NOTE - OBJECTIVE STATEMENT
facilitator RN - Pt received at CT area as code stroke. Pt with PMHx migraines. Pt was medical rapid response on floor.  Pt was initially c/o headache, had nurse on floor take her blood pressure. When the nurse went in to see her, he found her unable to speak, last known normal was 4:20 PM. Pt presenting with difficulty speaking, L sided weakness, diminished sensation on L side. 18G IV placed to R AC, 20G IV placed to L AC. Pending further orders at this time. Resp even and unlabored. No facial droop noted at this time. report endorsed to primary RN.

## 2023-02-28 NOTE — CONSULT NOTE ADULT - ATTENDING COMMENTS
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  Her past medical history is significant for migraine without aura, however she has had an episode of vision loss followed by headache in the remote past.  No history of oral contraceptive use, miscarriages or venous thromboembolic phenomenon.  She was evaluated in the emergency department and was treated with TNK for suspected acute ischemic stroke.  Today on neurological exam she is alert, awake, speech is fluent comprehension is intact.  On motor exam she has left and left strength 4/5, no hemisensory loss or facial asymmetry.  Impression: Suspected right hemispheric ischemic symptoms, s/p TNK  Complete resolution of symptoms today  Differential diagnosis includes aborted stroke versus complicated migraine  Plan:   - Cont with IV TNK precautions including BP goal<185/110 mmHg before the bolus  - No antiplatelets or anticoagulants at this time, Aspirin to be considered 24 hours after the IV TNK and repeat brain imaging  - DVT prophylaxis with s/c heparin to be considered in 24 hours from IV tPA after brain imaging  - Normotension   - Cont with IV hydration  - TTE with bubble study and telemetry to look for the cardiac source of embolism  - LDL and HbA1C   - Swallow eval  - PT/OT/Speech eval once medically stable    Above mentioned plan was discussed with patient, available family member and XXX ED MD in detail. All the questions were answered and concerns were addressed. 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  Her past medical history is significant for migraine without aura, however she has had an episode of vision loss followed by headache in the remote past.  intermittently takes Excedrin and / or Butalbital   No history of oral contraceptive use, miscarriages or venous thromboembolic phenomenon.  She was evaluated in the emergency department and was treated with TNK for suspected acute ischemic stroke.  Today on neurological exam she is alert, awake, speech is fluent comprehension is intact.  On motor exam she has left and left strength 4/5, no hemisensory loss or facial asymmetry.  Impression: Suspected right hemispheric ischemic symptoms, s/p TNK  Complete resolution of symptoms today  Differential diagnosis includes aborted stroke versus complicated migraine  Plan:   - Cont with IV TNK precautions including BP goal<185/110 mmHg before the bolus  - No antiplatelets or anticoagulants at this time, Aspirin to be considered 24 hours after the IV TNK and repeat brain imaging  - DVT prophylaxis with s/c heparin to be considered in 24 hours from IV tPA after brain imaging  - Normotension   - Cont with IV hydration  - TTE with bubble study and telemetry to look for the cardiac source of embolism  - LDL and HbA1C   - Swallow eval  - PT/OT/Speech eval once medically stable    Above mentioned plan was discussed with patient, available family member and XXX ED MD in detail. All the questions were answered and concerns were addressed.

## 2023-02-28 NOTE — H&P ADULT - ATTENDING COMMENTS
pt is a 33 yo female with hx migraines, who presents with hx difficulty word finding, headache,   states unable to speak for a moment, Pt a nurse manager on the floor, brought to ed, code  stroke called, Pt given TNK at 5 pm,   PE bp 140/76  rr 18 heent no jvd, lungs clear abdomen benign,  ext noedema  neuro right ext 5/5 left 4/5 upper  2/5 lower   sensation intact, fredy no facial droop     lab   wbc 6 hgb 12 hct 39  bicarb 24 cr     ct scan no acute hemorrhage or thrombosis    A/P  33 yo female with acute difficulty speaking , resolved post tnk,  for close neuro check in the icu  -monitor neuro checks q 1 hrs,  -hypercoagulable work up  -check echo to evaluate lv function  -dvt prophylaxis, asa post f.u ct scan  -neurology follow up  critically ill with acute stroke post tnk

## 2023-02-28 NOTE — ED PROVIDER NOTE - CLINICAL SUMMARY MEDICAL DECISION MAKING FREE TEXT BOX
32-year-old female here in the emergency department evaluated for possible stroke.  She has consistent left-sided deficits and expressive aphasia.  She is to receive an immediate CT, CTA, fingerstick, EKG.  Stroke code activated with neurology available for assessment.  Will give tenecteplase if indicated by imaging and/or signs and symptoms.

## 2023-03-01 LAB
ALBUMIN SERPL ELPH-MCNC: 3.4 G/DL — SIGNIFICANT CHANGE UP (ref 3.3–5)
ALP SERPL-CCNC: 26 U/L — LOW (ref 40–120)
ALT FLD-CCNC: 9 U/L — SIGNIFICANT CHANGE UP (ref 4–33)
ANION GAP SERPL CALC-SCNC: 9 MMOL/L — SIGNIFICANT CHANGE UP (ref 7–14)
APTT BLD: 29.3 SEC — SIGNIFICANT CHANGE UP (ref 27–36.3)
AST SERPL-CCNC: 11 U/L — SIGNIFICANT CHANGE UP (ref 4–32)
BASE EXCESS BLDV CALC-SCNC: -2.2 MMOL/L — LOW (ref -2–3)
BASOPHILS # BLD AUTO: 0.06 K/UL — SIGNIFICANT CHANGE UP (ref 0–0.2)
BASOPHILS NFR BLD AUTO: 0.9 % — SIGNIFICANT CHANGE UP (ref 0–2)
BILIRUB SERPL-MCNC: 0.2 MG/DL — SIGNIFICANT CHANGE UP (ref 0.2–1.2)
BLD GP AB SCN SERPL QL: NEGATIVE — SIGNIFICANT CHANGE UP
BUN SERPL-MCNC: 12 MG/DL — SIGNIFICANT CHANGE UP (ref 7–23)
CALCIUM SERPL-MCNC: 8.4 MG/DL — SIGNIFICANT CHANGE UP (ref 8.4–10.5)
CHLORIDE SERPL-SCNC: 107 MMOL/L — SIGNIFICANT CHANGE UP (ref 98–107)
CO2 BLDV-SCNC: 25.1 MMOL/L — SIGNIFICANT CHANGE UP (ref 22–26)
CO2 SERPL-SCNC: 21 MMOL/L — LOW (ref 22–31)
CREAT SERPL-MCNC: 0.61 MG/DL — SIGNIFICANT CHANGE UP (ref 0.5–1.3)
EGFR: 123 ML/MIN/1.73M2 — SIGNIFICANT CHANGE UP
EOSINOPHIL # BLD AUTO: 0.13 K/UL — SIGNIFICANT CHANGE UP (ref 0–0.5)
EOSINOPHIL NFR BLD AUTO: 1.9 % — SIGNIFICANT CHANGE UP (ref 0–6)
GLUCOSE SERPL-MCNC: 85 MG/DL — SIGNIFICANT CHANGE UP (ref 70–99)
HCO3 BLDV-SCNC: 24 MMOL/L — SIGNIFICANT CHANGE UP (ref 22–29)
HCT VFR BLD CALC: 38.3 % — SIGNIFICANT CHANGE UP (ref 34.5–45)
HGB BLD-MCNC: 12.2 G/DL — SIGNIFICANT CHANGE UP (ref 11.5–15.5)
IANC: 2.73 K/UL — SIGNIFICANT CHANGE UP (ref 1.8–7.4)
IMM GRANULOCYTES NFR BLD AUTO: 0.1 % — SIGNIFICANT CHANGE UP (ref 0–0.9)
INR BLD: 1.23 RATIO — HIGH (ref 0.88–1.16)
LACTATE SERPL-SCNC: 0.7 MMOL/L — SIGNIFICANT CHANGE UP (ref 0.5–2)
LYMPHOCYTES # BLD AUTO: 3.19 K/UL — SIGNIFICANT CHANGE UP (ref 1–3.3)
LYMPHOCYTES # BLD AUTO: 46.8 % — HIGH (ref 13–44)
MAGNESIUM SERPL-MCNC: 1.9 MG/DL — SIGNIFICANT CHANGE UP (ref 1.6–2.6)
MCHC RBC-ENTMCNC: 28.1 PG — SIGNIFICANT CHANGE UP (ref 27–34)
MCHC RBC-ENTMCNC: 31.9 GM/DL — LOW (ref 32–36)
MCV RBC AUTO: 88.2 FL — SIGNIFICANT CHANGE UP (ref 80–100)
MONOCYTES # BLD AUTO: 0.7 K/UL — SIGNIFICANT CHANGE UP (ref 0–0.9)
MONOCYTES NFR BLD AUTO: 10.3 % — SIGNIFICANT CHANGE UP (ref 2–14)
NEUTROPHILS # BLD AUTO: 2.73 K/UL — SIGNIFICANT CHANGE UP (ref 1.8–7.4)
NEUTROPHILS NFR BLD AUTO: 40 % — LOW (ref 43–77)
NRBC # BLD: 0 /100 WBCS — SIGNIFICANT CHANGE UP (ref 0–0)
NRBC # FLD: 0 K/UL — SIGNIFICANT CHANGE UP (ref 0–0)
PCO2 BLDV: 44 MMHG — SIGNIFICANT CHANGE UP (ref 39–52)
PH BLDV: 7.34 — SIGNIFICANT CHANGE UP (ref 7.32–7.43)
PHOSPHATE SERPL-MCNC: 3.7 MG/DL — SIGNIFICANT CHANGE UP (ref 2.5–4.5)
PLATELET # BLD AUTO: 260 K/UL — SIGNIFICANT CHANGE UP (ref 150–400)
PO2 BLDV: 41 MMHG — SIGNIFICANT CHANGE UP (ref 25–45)
POTASSIUM SERPL-MCNC: 3.8 MMOL/L — SIGNIFICANT CHANGE UP (ref 3.5–5.3)
POTASSIUM SERPL-SCNC: 3.8 MMOL/L — SIGNIFICANT CHANGE UP (ref 3.5–5.3)
PROT SERPL-MCNC: 6.4 G/DL — SIGNIFICANT CHANGE UP (ref 6–8.3)
PROTHROM AB SERPL-ACNC: 14.3 SEC — HIGH (ref 10.5–13.4)
RBC # BLD: 4.34 M/UL — SIGNIFICANT CHANGE UP (ref 3.8–5.2)
RBC # FLD: 14.6 % — HIGH (ref 10.3–14.5)
RH IG SCN BLD-IMP: POSITIVE — SIGNIFICANT CHANGE UP
SAO2 % BLDV: 77 % — SIGNIFICANT CHANGE UP (ref 67–88)
SODIUM SERPL-SCNC: 137 MMOL/L — SIGNIFICANT CHANGE UP (ref 135–145)
WBC # BLD: 6.82 K/UL — SIGNIFICANT CHANGE UP (ref 3.8–10.5)
WBC # FLD AUTO: 6.82 K/UL — SIGNIFICANT CHANGE UP (ref 3.8–10.5)

## 2023-03-01 PROCEDURE — 70450 CT HEAD/BRAIN W/O DYE: CPT | Mod: 26

## 2023-03-01 PROCEDURE — 99291 CRITICAL CARE FIRST HOUR: CPT | Mod: GC

## 2023-03-01 PROCEDURE — 70450 CT HEAD/BRAIN W/O DYE: CPT | Mod: 26,77

## 2023-03-01 PROCEDURE — 93306 TTE W/DOPPLER COMPLETE: CPT | Mod: 26

## 2023-03-01 RX ORDER — ACETAMINOPHEN 500 MG
1000 TABLET ORAL ONCE
Refills: 0 | Status: COMPLETED | OUTPATIENT
Start: 2023-03-01 | End: 2023-03-01

## 2023-03-01 RX ORDER — ONDANSETRON 8 MG/1
4 TABLET, FILM COATED ORAL ONCE
Refills: 0 | Status: COMPLETED | OUTPATIENT
Start: 2023-03-01 | End: 2023-03-01

## 2023-03-01 RX ORDER — SODIUM CHLORIDE 9 MG/ML
1000 INJECTION, SOLUTION INTRAVENOUS
Refills: 0 | Status: DISCONTINUED | OUTPATIENT
Start: 2023-03-01 | End: 2023-03-02

## 2023-03-01 RX ORDER — ASPIRIN/CALCIUM CARB/MAGNESIUM 324 MG
81 TABLET ORAL DAILY
Refills: 0 | Status: DISCONTINUED | OUTPATIENT
Start: 2023-03-01 | End: 2023-03-02

## 2023-03-01 RX ORDER — HEPARIN SODIUM 5000 [USP'U]/ML
5000 INJECTION INTRAVENOUS; SUBCUTANEOUS EVERY 8 HOURS
Refills: 0 | Status: DISCONTINUED | OUTPATIENT
Start: 2023-03-01 | End: 2023-03-04

## 2023-03-01 RX ORDER — ACETAMINOPHEN 500 MG
650 TABLET ORAL ONCE
Refills: 0 | Status: COMPLETED | OUTPATIENT
Start: 2023-03-01 | End: 2023-03-01

## 2023-03-01 RX ORDER — MAGNESIUM SULFATE 500 MG/ML
2 VIAL (ML) INJECTION ONCE
Refills: 0 | Status: COMPLETED | OUTPATIENT
Start: 2023-03-01 | End: 2023-03-01

## 2023-03-01 RX ADMIN — Medication 650 MILLIGRAM(S): at 18:45

## 2023-03-01 RX ADMIN — HEPARIN SODIUM 5000 UNIT(S): 5000 INJECTION INTRAVENOUS; SUBCUTANEOUS at 21:27

## 2023-03-01 RX ADMIN — Medication 1000 MILLIGRAM(S): at 06:45

## 2023-03-01 RX ADMIN — Medication 400 MILLIGRAM(S): at 06:21

## 2023-03-01 RX ADMIN — ONDANSETRON 4 MILLIGRAM(S): 8 TABLET, FILM COATED ORAL at 20:00

## 2023-03-01 RX ADMIN — Medication 400 MILLIGRAM(S): at 20:00

## 2023-03-01 RX ADMIN — Medication 25 GRAM(S): at 05:18

## 2023-03-01 RX ADMIN — Medication 1000 MILLIGRAM(S): at 20:45

## 2023-03-01 RX ADMIN — SODIUM CHLORIDE 75 MILLILITER(S): 9 INJECTION, SOLUTION INTRAVENOUS at 12:42

## 2023-03-01 RX ADMIN — Medication 81 MILLIGRAM(S): at 21:27

## 2023-03-01 RX ADMIN — Medication 650 MILLIGRAM(S): at 17:30

## 2023-03-01 NOTE — PROGRESS NOTE ADULT - ASSESSMENT
32F w/o known PMH presenting with AMS. Code Stroke initiated and tnk pushed. Pt transferred to MICU for closer monitoring.     =======NEURO=================  #Stroke  #r/o migraine  -BL A&O x 4 w/o focal deficits  -AMS on 2/28 at ~16:30  -CT Brain perfusion w/o acute pathology  -pushed tnk 2/28 at ~17:12  -3/1 AM CTH ordered d/t o/n HA; no acute pathology  > f/u neuro recs  > neuro checks q15 for 4h then q1h for 24h, then q4h thereafter  > r/p CTH at 24 hrs to assess stability, TTE, MRI     >  Aspirin 81 mg daily will be started in 24h if rCTH negative for hemorrhage    > Pharmacologic DVT prophylaxis started in 24h if rCTH negative for hemorrhage.  > keep BP < 180/105, gradual normotension over   > CBC, CMP, Troponin, Coags, Lipid Panel, HgA1C  > aspiration/fall precautions  > PT/OT  > S/S 12 hrs post-tpa    =======CARDIOVASCULAR=========  #Stable  > f/u TTE    =======RESPIRATORY============  #Stable    =======GASTROINTESTINAL=======  #NPO   > s/s 12 hrs post tnk    =======GENITOURINARY==========  #Stable  > no ramirez for 24 hrs post tnk    =======INFECTIOUS DISEASE=======  #Stable  - ctm    =======HEME===================  #DVT  > SCDs    =======ENDOCRINE==============  #Stable  > A1c     =======PROPHYLAXIS=============  -DVT: SCDs   -Code: Full  -Dispo: neuro check q1

## 2023-03-02 LAB
A1C WITH ESTIMATED AVERAGE GLUCOSE RESULT: 5.4 % — SIGNIFICANT CHANGE UP (ref 4–5.6)
ALBUMIN SERPL ELPH-MCNC: 3.3 G/DL — SIGNIFICANT CHANGE UP (ref 3.3–5)
ALP SERPL-CCNC: 27 U/L — LOW (ref 40–120)
ALT FLD-CCNC: 10 U/L — SIGNIFICANT CHANGE UP (ref 4–33)
ANION GAP SERPL CALC-SCNC: 8 MMOL/L — SIGNIFICANT CHANGE UP (ref 7–14)
APTT BLD: 30.9 SEC — SIGNIFICANT CHANGE UP (ref 27–36.3)
AST SERPL-CCNC: 12 U/L — SIGNIFICANT CHANGE UP (ref 4–32)
BASOPHILS # BLD AUTO: 0.04 K/UL — SIGNIFICANT CHANGE UP (ref 0–0.2)
BASOPHILS NFR BLD AUTO: 0.8 % — SIGNIFICANT CHANGE UP (ref 0–2)
BILIRUB SERPL-MCNC: 0.2 MG/DL — SIGNIFICANT CHANGE UP (ref 0.2–1.2)
BUN SERPL-MCNC: 14 MG/DL — SIGNIFICANT CHANGE UP (ref 7–23)
CALCIUM SERPL-MCNC: 8.4 MG/DL — SIGNIFICANT CHANGE UP (ref 8.4–10.5)
CHLORIDE SERPL-SCNC: 106 MMOL/L — SIGNIFICANT CHANGE UP (ref 98–107)
CHOLEST SERPL-MCNC: 183 MG/DL — SIGNIFICANT CHANGE UP
CO2 SERPL-SCNC: 22 MMOL/L — SIGNIFICANT CHANGE UP (ref 22–31)
CREAT SERPL-MCNC: 0.76 MG/DL — SIGNIFICANT CHANGE UP (ref 0.5–1.3)
EGFR: 108 ML/MIN/1.73M2 — SIGNIFICANT CHANGE UP
EOSINOPHIL # BLD AUTO: 0.12 K/UL — SIGNIFICANT CHANGE UP (ref 0–0.5)
EOSINOPHIL NFR BLD AUTO: 2.3 % — SIGNIFICANT CHANGE UP (ref 0–6)
ESTIMATED AVERAGE GLUCOSE: 108 — SIGNIFICANT CHANGE UP
GLUCOSE SERPL-MCNC: 93 MG/DL — SIGNIFICANT CHANGE UP (ref 70–99)
HCT VFR BLD CALC: 38.8 % — SIGNIFICANT CHANGE UP (ref 34.5–45)
HDLC SERPL-MCNC: 35 MG/DL — LOW
HGB BLD-MCNC: 12.2 G/DL — SIGNIFICANT CHANGE UP (ref 11.5–15.5)
IANC: 1.82 K/UL — SIGNIFICANT CHANGE UP (ref 1.8–7.4)
IMM GRANULOCYTES NFR BLD AUTO: 0.2 % — SIGNIFICANT CHANGE UP (ref 0–0.9)
INR BLD: 1.2 RATIO — HIGH (ref 0.88–1.16)
LIPID PNL WITH DIRECT LDL SERPL: 132 MG/DL — HIGH
LUPUS ANTICOAGULANT PROFILE RESULT: SIGNIFICANT CHANGE UP
LYMPHOCYTES # BLD AUTO: 2.74 K/UL — SIGNIFICANT CHANGE UP (ref 1–3.3)
LYMPHOCYTES # BLD AUTO: 51.8 % — HIGH (ref 13–44)
MAGNESIUM SERPL-MCNC: 2.4 MG/DL — SIGNIFICANT CHANGE UP (ref 1.6–2.6)
MCHC RBC-ENTMCNC: 27.1 PG — SIGNIFICANT CHANGE UP (ref 27–34)
MCHC RBC-ENTMCNC: 31.4 GM/DL — LOW (ref 32–36)
MCV RBC AUTO: 86 FL — SIGNIFICANT CHANGE UP (ref 80–100)
MONOCYTES # BLD AUTO: 0.56 K/UL — SIGNIFICANT CHANGE UP (ref 0–0.9)
MONOCYTES NFR BLD AUTO: 10.6 % — SIGNIFICANT CHANGE UP (ref 2–14)
NEUTROPHILS # BLD AUTO: 1.82 K/UL — SIGNIFICANT CHANGE UP (ref 1.8–7.4)
NEUTROPHILS NFR BLD AUTO: 34.3 % — LOW (ref 43–77)
NON HDL CHOLESTEROL: 148 MG/DL — HIGH
NRBC # BLD: 0 /100 WBCS — SIGNIFICANT CHANGE UP (ref 0–0)
NRBC # FLD: 0 K/UL — SIGNIFICANT CHANGE UP (ref 0–0)
PHOSPHATE SERPL-MCNC: 3.3 MG/DL — SIGNIFICANT CHANGE UP (ref 2.5–4.5)
PLATELET # BLD AUTO: 266 K/UL — SIGNIFICANT CHANGE UP (ref 150–400)
POTASSIUM SERPL-MCNC: 4.2 MMOL/L — SIGNIFICANT CHANGE UP (ref 3.5–5.3)
POTASSIUM SERPL-SCNC: 4.2 MMOL/L — SIGNIFICANT CHANGE UP (ref 3.5–5.3)
PROT SERPL-MCNC: 6.3 G/DL — SIGNIFICANT CHANGE UP (ref 6–8.3)
PROTHROM AB SERPL-ACNC: 14 SEC — HIGH (ref 10.5–13.4)
RBC # BLD: 4.51 M/UL — SIGNIFICANT CHANGE UP (ref 3.8–5.2)
RBC # FLD: 14.4 % — SIGNIFICANT CHANGE UP (ref 10.3–14.5)
SODIUM SERPL-SCNC: 136 MMOL/L — SIGNIFICANT CHANGE UP (ref 135–145)
T4 AB SER-ACNC: 6.14 UG/DL — SIGNIFICANT CHANGE UP (ref 5.1–13)
TRIGL SERPL-MCNC: 82 MG/DL — SIGNIFICANT CHANGE UP
TROPONIN T, HIGH SENSITIVITY RESULT: <6 NG/L — SIGNIFICANT CHANGE UP
TSH SERPL-MCNC: 1.08 UIU/ML — SIGNIFICANT CHANGE UP (ref 0.27–4.2)
WBC # BLD: 5.29 K/UL — SIGNIFICANT CHANGE UP (ref 3.8–10.5)
WBC # FLD AUTO: 5.29 K/UL — SIGNIFICANT CHANGE UP (ref 3.8–10.5)

## 2023-03-02 PROCEDURE — 99233 SBSQ HOSP IP/OBS HIGH 50: CPT | Mod: GC

## 2023-03-02 RX ORDER — SODIUM CHLORIDE 9 MG/ML
500 INJECTION, SOLUTION INTRAVENOUS
Refills: 0 | Status: DISCONTINUED | OUTPATIENT
Start: 2023-03-02 | End: 2023-03-02

## 2023-03-02 RX ADMIN — HEPARIN SODIUM 5000 UNIT(S): 5000 INJECTION INTRAVENOUS; SUBCUTANEOUS at 06:02

## 2023-03-02 RX ADMIN — Medication 1 TABLET(S): at 14:58

## 2023-03-02 RX ADMIN — Medication 1 TABLET(S): at 16:00

## 2023-03-02 NOTE — OCCUPATIONAL THERAPY INITIAL EVALUATION ADULT - PERTINENT HX OF CURRENT PROBLEM, REHAB EVAL
32 year old female with history of migraines (unassociated w/ auras) admitted for AMS, inability to speak. S/p code stroke and tPA administered at 17:12 on 2/28/23. CT brain with no evidence of CVA. Admitted for further monitoring.

## 2023-03-02 NOTE — DIETITIAN INITIAL EVALUATION ADULT - REASON FOR ADMISSION
Aphasia     Per chart review, patient is a "32F w/o known PMH presenting with AMS. Code Stroke initiated and tnk pushed. Pt transferred to MICU for closer monitoring.      Per chart review, patient is a "32F w/o known PMH presenting with AMS. Code Stroke initiated and tnk pushed. Pt transferred to MICU for closer monitoring".

## 2023-03-02 NOTE — DIETITIAN INITIAL EVALUATION ADULT - ORAL INTAKE PTA/DIET HISTORY
Patient alert and oriented at kiel Patient alert and oriented at time of assessment.   - Patient reports that she lives at home alone and occasionally eats 3 meals a day on weekends.  Patient states that she is not a snacker but does ensure she drinks water throughout the day. Patient reports her UBW is between 180-200 pounds with her recent weight being 195 pounds from a couple of months ago. Patient has noticed weight fluctuations between 5-15 pounds recently but reports not being one to consistently check her weight on the scale. Patient reports NKFA.

## 2023-03-02 NOTE — DIETITIAN INITIAL EVALUATION ADULT - FACTORS AFF FOOD INTAKE
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 Reports being busy and forgetting to eat./other (specify)

## 2023-03-02 NOTE — PHYSICAL THERAPY INITIAL EVALUATION ADULT - PERTINENT HX OF CURRENT PROBLEM, REHAB EVAL
32F w/o known PMH presenting with AMS. Code Stroke initiated and tnk pushed. Pt transferred to MICU for closer monitoring.

## 2023-03-02 NOTE — DIETITIAN INITIAL EVALUATION ADULT - ENERGY INTAKE
Poor (<50%) - Patient previously ordered for soft and bite sized diet 3/1-3/2, reports <50% of intake. At time of assessment, diet was upgraded to regular diet. Patient reports eating less than 50% of meals (only consumed 1/2 of oatmeal) and reports not enjoying hospital food. Reports that her friends may occasionally bring her a smoothie from outside, but reports being 'afraid to eat'. Food preferences obtained and noted - patient not amendable to trying oral nutrition supplements.

## 2023-03-02 NOTE — PHYSICAL THERAPY INITIAL EVALUATION ADULT - NSPTDISCHREC_GEN_A_CORE
Patient would benefit from acute rehab but patient declined stating she wants to go home, if patient goes home recommending physical therapy come to house.

## 2023-03-02 NOTE — DIETITIAN INITIAL EVALUATION ADULT - PERTINENT MEDS FT
MEDICATIONS  (STANDING):  heparin   Injectable 5000 Unit(s) SubCutaneous every 8 hours    MEDICATIONS  (PRN):  acetaminophen 325 mG/butalbital 50 mG/caffeine 40 mG 1 Tablet(s) Oral every 4 hours PRN migraine headache   Nutrition Pertinent Medications(STANDING):  heparin   Injectable 5000 Unit(s) SubCutaneous every 8 hours    MEDICATIONS  (PRN):  acetaminophen 325 mG/butalbital 50 mG/caffeine 40 mG 1 Tablet(s) Oral every 4 hours PRN migraine headache

## 2023-03-02 NOTE — DIETITIAN INITIAL EVALUATION ADULT - PERTINENT LABORATORY DATA
03-02    136  |  106  |  14  ----------------------------<  93  4.2   |  22  |  0.76    Ca    8.4      02 Mar 2023 00:20  Phos  3.3     03-02  Mg     2.40     03-02    TPro  6.3  /  Alb  3.3  /  TBili  0.2  /  DBili  x   /  AST  12  /  ALT  10  /  AlkPhos  27<L>  03-02   Final Anesthesia Post-op Assessment    Patient: Fabian Diana  Procedure(s) Performed: ESOPHAGOGASTRODUODENOSCOPYCOLONOSCOPY WITH BIOPSIES  Anesthesia type: Monitor Anesthesia Care    Vital Last Value   Temperature 37.3 °C (99.2 °F) (02/21/17 1027)   Pulse 104 (02/21/17 1221)   Respiratory Rate 16 (02/21/17 1221)   Non-Invasive   Blood Pressure 138/80 (02/21/17 1221)   Arterial  Blood Pressure     Pulse Oximetry 95 % (02/21/17 1221)     Last 24 I/O:   Intake/Output Summary (Last 24 hours) at 02/21/17 1223  Last data filed at 02/21/17 1221   Gross per 24 hour   Intake              650 ml   Output                0 ml   Net              650 ml       Patient location: GI 2.  POST-OP VITAL SIGNS: stable  LEVEL OF CONSCIOUSNESS: participates in exam, awake, oriented, answers questions appropriately and alert  RESPIRATORY STATUS: spontaneous ventilation  CARDIOVASCULAR: stable  HYDRATION: euvolemic    PAIN MANAGEMENT: well controlled  NAUSEA: None  AIRWAY PATENCY: patent  POST-OP ASSESSMENT: no complications, patient tolerated procedure well with no complications and sufficiently recovered from acute administration of anesthesia effects and able to participate in evaluation  COMPLICATIONS: none  HANDOFF:  Handoff to receiving nurse was performed and questions were answered       Labs WNL

## 2023-03-02 NOTE — DIETITIAN INITIAL EVALUATION ADULT - NSFNSGIIOFT_GEN_A_CORE
Per chart review, last BM 3/2  - Patient reported that she experienced nausea/vomiting yesterday 3/1 after eating her meal. She is not sure if it was because of her head hurting or because of the meal. Does not report any diarrhea or constipation.  - No bowel regimen ordered

## 2023-03-02 NOTE — DIETITIAN INITIAL EVALUATION ADULT - PERSON TAUGHT/METHOD
Reiterated the importance of increasing PO intake while in house. All questions and concerns noted and answered. Patient aware RD remains available./verbal instruction/patient instructed

## 2023-03-02 NOTE — CHART NOTE - NSCHARTNOTEFT_GEN_A_CORE
ICU Transfer Note    Transfer from: ICU    Transfer to: ( x ) Medicine    (  ) Telemetry     (   ) RCU        (    ) Palliative         (   ) Stroke Unit          (   ) __________________    Accepting Physician:  Signout given to: Dr. Micheline Rodriguez  Room: 504A    HPI/ICU COURSE:  32F w/ PMH of migraines (unassociated w/ auras) admitted for AMS. 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 Around 16:15 she began experiencing sudden onset word finding difficulty to the point of being almost completely unable to speak. RRT was called for evaluation. Rapidly transferred down to the ED's ambulance bay for a code stroke to be called at 16:47. Absolute contraindications to tenecteplase were ruled out by history with mother & patient. Risks/benefits discussed with mother who understood and was consenting to receive thrombolytic treatment. ED relevant for HDS & afebrile. CBC & CMP w/o acute pathology. Brain CT: No acute hemorrhage, mass effect, hydrocephalus or extra-axial collections. Neck CTA: No hemodynamically significant stenosis. Brain CTA: No large vessel occlusion or stenosis. Received tnk at 17:12. Transferred to MICU for closer monitoring.     While in MICU, Neuro following. BP controlled per permissive HTN guidelines. Received two CT heads which showed no acute pathology. DVT prophylaxis started promptly after. Pt continues to have left sided UE & LE weakness w/ left facial droop. Pt otherwise has remained HDS w/o further medical needs.       ASSESSMENT & PLAN:     32F w/o known PMH presenting with AMS. Code Stroke initiated and tnk pushed. Pt transferred to MICU for closer monitoring.     =======NEURO=================  #Stroke  #r/o migraine  -BL A&O x 4 w/o focal deficits  -AMS on 2/28 at ~16:30  -CT Brain perfusion w/o acute pathology  -pushed tnk 2/28 at ~17:12  -Stroke w/u     -3/1 AM CTH ordered d/t o/n HA; no acute pathology     -3/1 PM CTH 24 hrs post TNK: no acute pathology     -3/1 TTE: no acute pathology  > f/u neuro recs     > no need for ASA  > neuro checks q4h  > stroke w/u     >  ordered MRI    > f/u Coags, Lipid Panel, HgA1C, TSH   > keep BP < 180/105, gradual normotension over   > c/w Esgig for pain  > aspiration/fall precautions  > PT/OT    =======CARDIOVASCULAR=========  #Stable    =======RESPIRATORY============  #Stable    =======GASTROINTESTINAL=======  #Stable  > Regular diet    =======GENITOURINARY==========  #Stable    =======INFECTIOUS DISEASE=======  #Stable    =======HEME===================  #DVT  > sq Hep     =======ENDOCRINE==============  #Stable    =======PROPHYLAXIS=============  -DVT: sqHep  -Code: Full  -Dispo: neuro check q4             FOR FOLLOW UP:  [] MR Head  [] Neuro recs  [] f/u stroke w/u: hypercoags, Lipid Panel, HgA1C, TSH

## 2023-03-02 NOTE — PROGRESS NOTE ADULT - ASSESSMENT
32F w/o known PMH presenting with AMS. Code Stroke initiated and tnk pushed. Pt transferred to MICU for closer monitoring.     =======NEURO=================  #Stroke  #r/o migraine  -BL A&O x 4 w/o focal deficits  -AMS on 2/28 at ~16:30  -CT Brain perfusion w/o acute pathology  -pushed tnk 2/28 at ~17:12  -Stroke w/u     -3/1 AM CTH ordered d/t o/n HA; no acute pathology     -3/1 PM CTH 24 hrs post TNK: no acute pathology     -3/1 TTE: no acute pathology  > f/u neuro recs  > neuro checks q4h  > stroke w/u     >  r/p CTH at 24 hrs to assess stability, TTE, MRI     >  Aspirin 81 mg daily will be started in 24h if rCTH negative for hemorrhage    > Pharmacologic DVT prophylaxis started in 24h if rCTH negative for hemorrhage.  > keep BP < 180/105, gradual normotension over   > CBC, CMP, Troponin, Coags, Lipid Panel, HgA1C  > aspiration/fall precautions  > PT/OT  > S/S 12 hrs post-tpa    =======CARDIOVASCULAR=========  #Stable  > f/u TTE    =======RESPIRATORY============  #Stable    =======GASTROINTESTINAL=======  #NPO   > s/s 12 hrs post tnk    =======GENITOURINARY==========  #Stable  > no ramirez for 24 hrs post tnk    =======INFECTIOUS DISEASE=======  #Stable  - ctm    =======HEME===================  #DVT  > SCDs    =======ENDOCRINE==============  #Stable  > A1c     =======PROPHYLAXIS=============  -DVT: SCDs   -Code: Full  -Dispo: neuro check q1  32F w/o known PMH presenting with AMS. Code Stroke initiated and tnk pushed. Pt transferred to MICU for closer monitoring.     =======NEURO=================  #Stroke  #r/o migraine  -BL A&O x 4 w/o focal deficits  -AMS on 2/28 at ~16:30  -CT Brain perfusion w/o acute pathology  -pushed tnk 2/28 at ~17:12  -Stroke w/u     -3/1 AM CTH ordered d/t o/n HA; no acute pathology     -3/1 PM CTH 24 hrs post TNK: no acute pathology     -3/1 TTE: no acute pathology  > f/u neuro recs     > no need for ASA  > neuro checks q4h  > stroke w/u     >  ordered MRI    > f/u Coags, Lipid Panel, HgA1C, TSH   > keep BP < 180/105, gradual normotension over   > c/w Esgig for pain  > aspiration/fall precautions  > PT/OT    =======CARDIOVASCULAR=========  #Stable    =======RESPIRATORY============  #Stable    =======GASTROINTESTINAL=======  #Stable  > Regular diet    =======GENITOURINARY==========  #Stable    =======INFECTIOUS DISEASE=======  #Stable    =======HEME===================  #DVT  > sq Hep     =======ENDOCRINE==============  #Stable    =======PROPHYLAXIS=============  -DVT: sqHep  -Code: Full  -Dispo: neuro check q4

## 2023-03-02 NOTE — CHART NOTE - NSCHARTNOTEFT_GEN_A_CORE
MAR Accept Note  Transfer to: ( x ) Medicine    (  ) Telemetry     (   ) RCU        (    ) Palliative         (   ) Stroke Unit          (   ) __________________    Accepting Physician: Dr. Micheline Rodriguez  Room: 504A    Patient seen and examined.   Labs and data reviewed.   No findings precluding transfer of service.       HPI/MICU COURSE:   Please refer to MICU transfer note for full details. 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 Was sent to ED, code stroke, s/p TPA, admitted to MICU for q1h neurochecks.  24h CT negative. Per MICU team neuro considering complex migraine on differential. Pt still with weakness.      FOR FOLLOW-UP:  [ ] q4 neurocheck  [ ] MRI  [ ] neuro reccs    Breanna Higgins MD  Internal Medicine PGY-3 MAR Accept Note  Transfer to: ( x ) Medicine    (  ) Telemetry     (   ) RCU        (    ) Palliative         (   ) Stroke Unit          (   ) __________________    Accepting Physician: Dr. Micheline Rodriguez  Room: 504A      Labs and data reviewed.   No findings precluding transfer of service.       HPI/MICU COURSE:   Please refer to MICU transfer note for full details. 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 Was sent to ED, code stroke, s/p TPA, admitted to MICU for q1h neurochecks.  24h CT negative. Per MICU team neuro considering complex migraine on differential. Pt still with weakness per MICU team.    Patient seen in MICU. Pt speaking with dietitian. States doing "ok" . Defers physical exam at this time.      FOR FOLLOW-UP:  [ ] q4 neurocheck  [ ] MRI  [ ] neuro reccs    Breanna Higgins MD  Internal Medicine PGY-3

## 2023-03-02 NOTE — PROGRESS NOTE ADULT - ATTENDING COMMENTS
The patient is a 32 Y.O. 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 Seen by neurology in the ED s/p TNK at 5 pm.     HA this am, s/p urgent CTH, currently on permissive HTN    # Acute CVA s/p TNK  # HA's/ Migraines  - S/P TNK, at 5pm, CTH/perfusion without acute findings  - C/W neurocheck q1 hours until negative CT 24 hours  - Permissive htn  - PT/OT, passed dysphagia screen, A1c, lipid, TTE with bubble,   - MRI  - F/U with neurology  - SQH and ASA after repeat CT at 5pm  - DVT ppx- SCD,  - Dispo- full code    D?W patient and MICU team at bedside.
The patient is a 32 Y.O. 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 Seen by neurology in the ED s/p TNK at 5 pm.     S/P negative CTH x2 since TPA was given. HA but now well controlled. Pending MRI. Still with weakness in the LLL.     # Acute CVA s/p TNK  # HA's/ Migraines  - S/P TNK, at 5pm, CTH/perfusion without acute findings  - C/W neurocheck q4 hours given negative CT 24 hours  - Permissive htn  - PT/OT, passed dysphagia screen, TTE with bubble  - Lipid, a1c was not sent this AM.   - MRI pending  - F/U with neurology  - ASA on hold per neurology.   - DVT ppx- SCD,  - Dispo- full code    D?W patient and MICU team at bedside.

## 2023-03-02 NOTE — PHYSICAL THERAPY INITIAL EVALUATION ADULT - ADDITIONAL COMMENTS
Patient lives in upstairs apartment of private home, 10 steps to negotiate. patient was independent prior to admission in all ADL and ambulation. 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

## 2023-03-02 NOTE — DIETITIAN INITIAL EVALUATION ADULT - ADD RECOMMEND
- Recommend continuing regular diet unless warranted by team  - Recommend additional multivitamin for added micronutrient supplementation unless medically contraindicative   - Monitor labs, skin integrity, GI tolerance, weights, and bowel movement regularity

## 2023-03-02 NOTE — OCCUPATIONAL THERAPY INITIAL EVALUATION ADULT - GENERAL OBSERVATIONS, REHAB EVAL
Patient received semisupine in bed in NAD; agreeable to participate in OT evaluation. +cardiac monitor.

## 2023-03-02 NOTE — OCCUPATIONAL THERAPY INITIAL EVALUATION ADULT - NSOTDISCHREC_GEN_A_CORE
Recommending inpatient rehab at this time to improve functional independence. Patient would benefit from PM&R consult to determine acute rehab eligibility. OT to continue to follow.

## 2023-03-02 NOTE — DIETITIAN INITIAL EVALUATION ADULT - ENTER FROM (CAL/KG)
@1552 gave 30mg of Ketorlac into Lt-Deltoid/IM.  Pt tolerated well.  Ketorlac 30mg/#-DK, exp 4/1/20/NDC# [id number]   25

## 2023-03-03 LAB
ALBUMIN SERPL ELPH-MCNC: 3.6 G/DL — SIGNIFICANT CHANGE UP (ref 3.3–5)
ALP SERPL-CCNC: 28 U/L — LOW (ref 40–120)
ALT FLD-CCNC: 9 U/L — SIGNIFICANT CHANGE UP (ref 4–33)
ANION GAP SERPL CALC-SCNC: 9 MMOL/L — SIGNIFICANT CHANGE UP (ref 7–14)
AST SERPL-CCNC: 11 U/L — SIGNIFICANT CHANGE UP (ref 4–32)
BILIRUB SERPL-MCNC: <0.2 MG/DL — SIGNIFICANT CHANGE UP (ref 0.2–1.2)
BUN SERPL-MCNC: 18 MG/DL — SIGNIFICANT CHANGE UP (ref 7–23)
CALCIUM SERPL-MCNC: 9 MG/DL — SIGNIFICANT CHANGE UP (ref 8.4–10.5)
CHLORIDE SERPL-SCNC: 106 MMOL/L — SIGNIFICANT CHANGE UP (ref 98–107)
CO2 SERPL-SCNC: 22 MMOL/L — SIGNIFICANT CHANGE UP (ref 22–31)
CREAT SERPL-MCNC: 0.69 MG/DL — SIGNIFICANT CHANGE UP (ref 0.5–1.3)
EGFR: 120 ML/MIN/1.73M2 — SIGNIFICANT CHANGE UP
GLUCOSE SERPL-MCNC: 88 MG/DL — SIGNIFICANT CHANGE UP (ref 70–99)
HCT VFR BLD CALC: 39.6 % — SIGNIFICANT CHANGE UP (ref 34.5–45)
HGB BLD-MCNC: 12.3 G/DL — SIGNIFICANT CHANGE UP (ref 11.5–15.5)
MAGNESIUM SERPL-MCNC: 1.9 MG/DL — SIGNIFICANT CHANGE UP (ref 1.6–2.6)
MCHC RBC-ENTMCNC: 27.4 PG — SIGNIFICANT CHANGE UP (ref 27–34)
MCHC RBC-ENTMCNC: 31.1 GM/DL — LOW (ref 32–36)
MCV RBC AUTO: 88.2 FL — SIGNIFICANT CHANGE UP (ref 80–100)
NRBC # BLD: 0 /100 WBCS — SIGNIFICANT CHANGE UP (ref 0–0)
NRBC # FLD: 0 K/UL — SIGNIFICANT CHANGE UP (ref 0–0)
PHOSPHATE SERPL-MCNC: 3.5 MG/DL — SIGNIFICANT CHANGE UP (ref 2.5–4.5)
PLATELET # BLD AUTO: 280 K/UL — SIGNIFICANT CHANGE UP (ref 150–400)
POTASSIUM SERPL-MCNC: 4.3 MMOL/L — SIGNIFICANT CHANGE UP (ref 3.5–5.3)
POTASSIUM SERPL-SCNC: 4.3 MMOL/L — SIGNIFICANT CHANGE UP (ref 3.5–5.3)
PROT SERPL-MCNC: 6.7 G/DL — SIGNIFICANT CHANGE UP (ref 6–8.3)
RBC # BLD: 4.49 M/UL — SIGNIFICANT CHANGE UP (ref 3.8–5.2)
RBC # FLD: 14.6 % — HIGH (ref 10.3–14.5)
SODIUM SERPL-SCNC: 137 MMOL/L — SIGNIFICANT CHANGE UP (ref 135–145)
TROPONIN T, HIGH SENSITIVITY RESULT: <6 NG/L — SIGNIFICANT CHANGE UP
WBC # BLD: 5.93 K/UL — SIGNIFICANT CHANGE UP (ref 3.8–10.5)
WBC # FLD AUTO: 5.93 K/UL — SIGNIFICANT CHANGE UP (ref 3.8–10.5)

## 2023-03-03 PROCEDURE — 99222 1ST HOSP IP/OBS MODERATE 55: CPT

## 2023-03-03 PROCEDURE — 99232 SBSQ HOSP IP/OBS MODERATE 35: CPT

## 2023-03-03 PROCEDURE — 70551 MRI BRAIN STEM W/O DYE: CPT | Mod: 26

## 2023-03-03 RX ORDER — ONDANSETRON 8 MG/1
4 TABLET, FILM COATED ORAL ONCE
Refills: 0 | Status: COMPLETED | OUTPATIENT
Start: 2023-03-03 | End: 2023-03-03

## 2023-03-03 RX ORDER — ACETAMINOPHEN 500 MG
1000 TABLET ORAL ONCE
Refills: 0 | Status: COMPLETED | OUTPATIENT
Start: 2023-03-03 | End: 2023-03-03

## 2023-03-03 RX ADMIN — Medication 1 TABLET(S): at 05:28

## 2023-03-03 RX ADMIN — Medication 1 TABLET(S): at 06:26

## 2023-03-03 RX ADMIN — Medication 400 MILLIGRAM(S): at 08:30

## 2023-03-03 RX ADMIN — Medication 1000 MILLIGRAM(S): at 08:45

## 2023-03-03 RX ADMIN — ONDANSETRON 4 MILLIGRAM(S): 8 TABLET, FILM COATED ORAL at 09:42

## 2023-03-03 NOTE — CHART NOTE - NSCHARTNOTEFT_GEN_A_CORE
Called to see patient for headache and nausea.  Patient laying in bed in NAD . Complains of Right sided temporal headache 7/10 that started early am . Weakness unchanged, no difficulty with speech , no dizziness laying in bed . Has hx of migraines but reports current pain different from usual migraines.   patient reports sleeping less during hospitalization     HR 77    121/68      18      100 RA       98.1 temp     on exam LUE weakness and LLE weakness , EOMI intact PERRLA b/l, no facial asymmetry , tongue midline     Plan :   Headache   - Tylenol IV ordered   Continue to monitor frequent vitals and neuro checks   Above discussed with neuro - will follow up . MRI pending   - await MRI   - continue DVT ppx   - Tele   - continue PT

## 2023-03-03 NOTE — PROGRESS NOTE ADULT - ASSESSMENT
32 y.o. F w/ a hx of migraines who presents with acute onset headache and L sided weakness.     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

## 2023-03-03 NOTE — PROGRESS NOTE ADULT - NSPROGADDITIONALINFOA_GEN_ALL_CORE
STROKE FELLOW NOTE    MRI brain negative. Informed the patient of the results.   Exam today non-focal besides LUE drift (santana sign negative).     Imp: Transient L. hemiparesis of unclear etiology; Possibly migraine with aura versus functional. No AIS on imaging.     F/u with General Neurology; Dr. Costa

## 2023-03-03 NOTE — CONSULT NOTE ADULT - SUBJECTIVE AND OBJECTIVE BOX
Patient is a 32y old  Female who presents with a chief complaint of Stroke (02 Mar 2023 12:14)      HPI:  32F w/ PMH of migraines (unassociated w/ auras) admitted for AMS. 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 Around 16:15 she began experiencing sudden onset word finding difficulty to the point of being almost completely unable to speak. RRT was called for evaluation. Rapidly transferred down to the ED's ambulance bay for a code stroke to be called at 16:47. Patient only endorsing HA & difficulty speaking and only able to communicate through nodding/shaking head "no," or fragmentary language. She denies a episode like this in the past. Mother was called on phone as collateral, discussed current presentation which mother agrees has never happened before but she does have a history of migraine. Absolute contraindications to tenecteplase were ruled out by history with mother & patient. Risks/benefits discussed with mother who understood and was consenting to receive thrombolytic treatment. Patient denies possibility of pregnancy and reported that her menstrual period was recently but unable to state when.     ED relevant for HDS & afebrile. CBC & CMP w/o acute pathology. Brain CT: No acute hemorrhage, mass effect, hydrocephalus or extra-axial collections. Neck CTA: No hemodynamically significant stenosis. Brain CTA: No large vessel occlusion or stenosis. Received tnk at 17:12. Transferred to MICU for closer monitoring.  (28 Feb 2023 18:18)      REVIEW OF SYSTEMS  L sided weakness    PAST MEDICAL & SURGICAL HISTORY   Migraine  No significant past surgical history      SOCIAL HISTORY  Smoking - Denied  EtOH - Denied   Drugs - Denied    FUNCTIONAL HISTORY  Lives in private home in upstairs apartment, 10 steps to enter  Independent at baseline  works as nurse manager at [employer]    CURRENT FUNCTIONAL STATUS  3/3  Bed Mobility  Bed Mobility Training Rehab Potential: good, to achieve stated therapy goals  Bed Mobility Training Sit-to-Supine: independent  Bed Mobility Training Supine-to-Sit: independent    Sit-Stand Transfer Training  Sit-to-Stand Transfer Training Rehab Potential: good, to achieve stated therapy goals  Transfer Training Sit-to-Stand Transfer: supervsion;  supervision;  nonverbal cues (demo/gestures);  1 person assist;  full weight-bearing   rolling walker  Transfer Training Stand-to-Sit Transfer: supervsion;  supervision;  nonverbal cues (demo/gestures);  1 person assist;  full weight-bearing   rolling walker  Sit-to-Stand Transfer Training Transfer Safety Analysis: decreased step length;  decreased balance;  impaired balance;  impaired postural control;  rolling walker    Gait Training  Gait Training Rehab Potential: good, to achieve stated therapy goals  Gait Training: supervsion;  supervision;  1 person assist;  nonverbal cues (demo/gestures);  full weight-bearing   rolling walker;  25 feet  Gait Analysis: 3-point gait   decreased lucian;  decreased step length;  impaired balance;  impaired postural control;  25 feet;  rolling walker        FAMILY HISTORY   No pertinent family history in first degree relatives        RECENT LABS/IMAGING  CBC Full  -  ( 03 Mar 2023 05:36 )  WBC Count : 5.93 K/uL  RBC Count : 4.49 M/uL  Hemoglobin : 12.3 g/dL  Hematocrit : 39.6 %  Platelet Count - Automated : 280 K/uL  Mean Cell Volume : 88.2 fL  Mean Cell Hemoglobin : 27.4 pg  Mean Cell Hemoglobin Concentration : 31.1 gm/dL  Auto Neutrophil # : x  Auto Lymphocyte # : x  Auto Monocyte # : x  Auto Eosinophil # : x  Auto Basophil # : x  Auto Neutrophil % : x  Auto Lymphocyte % : x  Auto Monocyte % : x  Auto Eosinophil % : x  Auto Basophil % : x    03-03    137  |  106  |  18  ----------------------------<  88  4.3   |  22  |  0.69    Ca    9.0      03 Mar 2023 05:36  Phos  3.5     03-03  Mg     1.90     03-03    TPro  6.7  /  Alb  3.6  /  TBili  <0.2  /  DBili  x   /  AST  11  /  ALT  9   /  AlkPhos  28<L>  03-03        VITALS  T(C): 36.9 (03-03-23 @ 12:00), Max: 36.9 (03-02-23 @ 20:00)  HR: 74 (03-03-23 @ 12:00) (72 - 82)  BP: 121/71 (03-03-23 @ 12:00) (103/60 - 122/78)  RR: 18 (03-03-23 @ 12:00) (18 - 25)  SpO2: 100% (03-03-23 @ 12:00) (99% - 100%)  Wt(kg): --    ALLERGIES  No Known Allergies      MEDICATIONS   acetaminophen 325 mG/butalbital 50 mG/caffeine 40 mG 1 Tablet(s) Oral every 4 hours PRN  heparin   Injectable 5000 Unit(s) SubCutaneous every 8 hours      ----------------------------------------------------------------------------------------  PHYSICAL EXAM  Constitutional - NAD, Comfortable  HEENT - NCAT, EOMI  Neck - Supple, No limited ROM  Chest - CTA bilaterally, No wheeze, No rhonchi, No crackles  Cardiovascular - RRR, S1S2, No murmurs  Abdomen - BS+, Soft, NTND  Extremities - No C/C/E, No calf tenderness   Neurologic Exam -                    Cognitive - Awake, Alert, AAO to self, place, date, year, situation     Communication - Fluent, No dysarthria     Cranial Nerves - CN 2-12 intact     Motor - No focal deficits                    LEFT    UE - ShAB 5/5, EF 5/5, EE 5/5, WE 5/5,  5/5                    RIGHT UE - ShAB 5/5, EF 5/5, EE 5/5, WE 5/5,  5/5                    LEFT    LE - HF 5/5, KE 5/5, DF 5/5, PF 5/5                    RIGHT LE - HF 5/5, KE 5/5, DF 5/5, PF 5/5        Sensory - Intact to LT     Reflexes - DTR Intact, No primitive reflexive     Coordination - FTN intact     OculoVestibular - No saccades, No nystagmus, VOR         Balance - WNL Static  Psychiatric - Mood stable, Affect WNL  ----------------------------------------------------------------------------------------  ASSESSMENT/PLAN  33 yo f p/w L sided weakness  continue bedside PT   Pain -  DVT PPX -   Rehab -     incomplete note, consult in progress Patient is a 32y old  Female who presents with a chief complaint of Stroke (02 Mar 2023 12:14)      HPI:  32F w/ PMH of migraines (unassociated w/ auras) admitted for AMS. 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 Around 16:15 she began experiencing sudden onset word finding difficulty to the point of being almost completely unable to speak. RRT was called for evaluation. Rapidly transferred down to the ED's ambulance bay for a code stroke to be called at 16:47. Patient only endorsing HA & difficulty speaking and only able to communicate through nodding/shaking head "no," or fragmentary language. She denies a episode like this in the past. Mother was called on phone as collateral, discussed current presentation which mother agrees has never happened before but she does have a history of migraine. Absolute contraindications to tenecteplase were ruled out by history with mother & patient. Risks/benefits discussed with mother who understood and was consenting to receive thrombolytic treatment. Patient denies possibility of pregnancy and reported that her menstrual period was recently but unable to state when.     ED relevant for HDS & afebrile. CBC & CMP w/o acute pathology. Brain CT: No acute hemorrhage, mass effect, hydrocephalus or extra-axial collections. Neck CTA: No hemodynamically significant stenosis. Brain CTA: No large vessel occlusion or stenosis. Received tnk at 17:12. Transferred to MICU for closer monitoring.  (28 Feb 2023 18:18)    MRI head negative for CVA.  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 migraine  denies aphasia    REVIEW OF SYSTEMS  L sided weakness  nausea with intermittent vomiting  headache  sensitivity to light    PAST MEDICAL & SURGICAL HISTORY   Migraine  No significant past surgical history      SOCIAL HISTORY  Smoking - Denied  EtOH - occasional  Drugs - Denied    FUNCTIONAL HISTORY  Lives in private home in upstairs apartment, 10 steps to enter  Independent at baseline  works as nurse manager at [employer]    CURRENT FUNCTIONAL STATUS  3/3  Bed Mobility  Bed Mobility Training Rehab Potential: good, to achieve stated therapy goals  Bed Mobility Training Sit-to-Supine: independent  Bed Mobility Training Supine-to-Sit: independent    Sit-Stand Transfer Training  Sit-to-Stand Transfer Training Rehab Potential: good, to achieve stated therapy goals  Transfer Training Sit-to-Stand Transfer: supervsion;  supervision;  nonverbal cues (demo/gestures);  1 person assist;  full weight-bearing   rolling walker  Transfer Training Stand-to-Sit Transfer: supervsion;  supervision;  nonverbal cues (demo/gestures);  1 person assist;  full weight-bearing   rolling walker  Sit-to-Stand Transfer Training Transfer Safety Analysis: decreased step length;  decreased balance;  impaired balance;  impaired postural control;  rolling walker    Gait Training  Gait Training Rehab Potential: good, to achieve stated therapy goals  Gait Training: supervsion;  supervision;  1 person assist;  nonverbal cues (demo/gestures);  full weight-bearing   rolling walker;  25 feet  Gait Analysis: 3-point gait   decreased lucian;  decreased step length;  impaired balance;  impaired postural control;  25 feet;  rolling walker        FAMILY HISTORY   No pertinent family history in first degree relatives        RECENT LABS/IMAGING  < from: MR Head No Cont (03.03.23 @ 12:56) >    ACC: [id number] EXAM:  MR BRAIN   ORDERED BY: STEPHANY GARCIA     PROCEDURE DATE:  03/03/2023    INTERPRETATION:  INDICATIONS:  Status post stroke code    TECHNIQUE:  Multiplanar imaging was performed using T1 weighted, T2   weighted and FLAIR sequences.  Diffusion weighted and susceptibility   sensitive images were also obtained.    COMPARISON EXAMINATION: CT, CTA, CTP 2/28/2023    FINDINGS:  VENTRICLES AND SULCI:  Normal.  INTRA-AXIAL:  No mass, abnormal signal or evidence of acute cerebral   ischemia.  EXTRA-AXIAL:  No mass or collection.  VISUALIZED SINUSES:  Normal.  VISUALIZED MASTOIDS:  Clear.  CALVARIUM:  Normal.  CAROTID FLOW VOIDS:  Present.  MISCELLANEOUS:  None.      IMPRESSION:  Normal noncontrast MRI of the brain.    --- End of Report ---      ANURAG WATKINS MD; Attending Radiologist  This document has been electronically signed. Mar  3 2    < end of copied text >    CBC Full  -  ( 03 Mar 2023 05:36 )  WBC Count : 5.93 K/uL  RBC Count : 4.49 M/uL  Hemoglobin : 12.3 g/dL  Hematocrit : 39.6 %  Platelet Count - Automated : 280 K/uL  Mean Cell Volume : 88.2 fL  Mean Cell Hemoglobin : 27.4 pg  Mean Cell Hemoglobin Concentration : 31.1 gm/dL  Auto Neutrophil # : x  Auto Lymphocyte # : x  Auto Monocyte # : x  Auto Eosinophil # : x  Auto Basophil # : x  Auto Neutrophil % : x  Auto Lymphocyte % : x  Auto Monocyte % : x  Auto Eosinophil % : x  Auto Basophil % : x    03-03    137  |  106  |  18  ----------------------------<  88  4.3   |  22  |  0.69    Ca    9.0      03 Mar 2023 05:36  Phos  3.5     03-03  Mg     1.90     03-03    TPro  6.7  /  Alb  3.6  /  TBili  <0.2  /  DBili  x   /  AST  11  /  ALT  9   /  AlkPhos  28<L>  03-03        VITALS  T(C): 36.9 (03-03-23 @ 12:00), Max: 36.9 (03-02-23 @ 20:00)  HR: 74 (03-03-23 @ 12:00) (72 - 82)  BP: 121/71 (03-03-23 @ 12:00) (103/60 - 122/78)  RR: 18 (03-03-23 @ 12:00) (18 - 25)  SpO2: 100% (03-03-23 @ 12:00) (99% - 100%)  Wt(kg): --    ALLERGIES  No Known Allergies      MEDICATIONS   acetaminophen 325 mG/butalbital 50 mG/caffeine 40 mG 1 Tablet(s) Oral every 4 hours PRN  heparin   Injectable 5000 Unit(s) SubCutaneous every 8 hours      ----------------------------------------------------------------------------------------  PHYSICAL EXAM  Constitutional - NAD, Comfortable  HEENT - NCAT, EOMI  Neck - Supple, No limited ROM  Chest - no respiratory distress  Cardiovascular - RRR, S1S2   Abdomen - Soft, NTND  Extremities - No C/C/E, No calf tenderness   Neurologic Exam -                    Cognitive - Awake, Alert, AAO to self, place, date, year, situation     Communication - Fluent, No dysarthria     Cranial Nerves - CN 2-12 intact     Motor -                     LEFT    UE - 4-/5                    RIGHT UE - 5/5                    LEFT    LE - 4-/5                    RIGHT LE -  5/5        Sensory - Intact to LT        Balance - WNL Static  Psychiatric - Mood stable, Affect WNL  ----------------------------------------------------------------------------------------  ASSESSMENT/PLAN  33 yo f p/w L sided weakness  continue bedside PT and OT  Pain -esgic  1 tab q 4 prn  diet: regular  DVT PPX - heparin  Rehab - ambulated 25' with supervision today. Recommend follow up PT to attempt farther distance ambulation and stairs. Patient's goal is for home discharge. Will monitor for improvement. goal is for discharge home with assistive device, home PT and OT, and family assistance

## 2023-03-03 NOTE — PROGRESS NOTE ADULT - ASSESSMENT
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 MRI negative for acute infarct, likely migraine with aura.     Recommendations   Imaging:  [x] rCTH at 24 hours stable, results as above  [x] MRI brain w/o contrast, results as noted above  [x] TTE, results as noted above.   [] Baseline EKG & CXR    Meds:  [] No need for aspirin as MRI without acute infarct.   [] Atorvastatin 40 mg daily (long-term goal and titrate to LDL < 100), diet and exercise.   [] Pharmacologic DVT prophylaxis    Labs:  [] CBC, CMP, Troponin, Coags  [] LDL: 132  [] HgA1C: 5.4    Other:  [] neuro-checks q4h  [] BP checks: q4h  [] Normotension   [] Telemetry   [] Continue regular diet   [] Aspiration Precautions  [] Fall Precautions  [] PT/OT : Home PT/OT   [] Can follow up with Dr. Costa as outpatient at [address] [place] [address] [phone].   [] No further stroke workup required inpatient   [] No neurovascular contraindications to discharge.     Case discussed with stroke fellow, Dr. Crespo, under supervision of stroke attending, Dr. Silvestre.    Please call with questions: [phone]

## 2023-03-04 LAB — CARDIOLIPIN AB SER-ACNC: NEGATIVE — SIGNIFICANT CHANGE UP

## 2023-03-04 PROCEDURE — 99232 SBSQ HOSP IP/OBS MODERATE 35: CPT

## 2023-03-04 RX ORDER — ACETAMINOPHEN 500 MG
650 TABLET ORAL EVERY 6 HOURS
Refills: 0 | Status: DISCONTINUED | OUTPATIENT
Start: 2023-03-04 | End: 2023-03-06

## 2023-03-04 RX ORDER — ONDANSETRON 8 MG/1
4 TABLET, FILM COATED ORAL EVERY 12 HOURS
Refills: 0 | Status: DISCONTINUED | OUTPATIENT
Start: 2023-03-04 | End: 2023-03-06

## 2023-03-04 RX ORDER — MAGNESIUM SULFATE 500 MG/ML
1 VIAL (ML) INJECTION ONCE
Refills: 0 | Status: COMPLETED | OUTPATIENT
Start: 2023-03-04 | End: 2023-03-04

## 2023-03-04 RX ORDER — SODIUM CHLORIDE 9 MG/ML
1000 INJECTION, SOLUTION INTRAVENOUS
Refills: 0 | Status: DISCONTINUED | OUTPATIENT
Start: 2023-03-04 | End: 2023-03-05

## 2023-03-04 RX ORDER — KETOROLAC TROMETHAMINE 30 MG/ML
15 SYRINGE (ML) INJECTION ONCE
Refills: 0 | Status: DISCONTINUED | OUTPATIENT
Start: 2023-03-04 | End: 2023-03-04

## 2023-03-04 RX ORDER — PROCHLORPERAZINE MALEATE 5 MG
10 TABLET ORAL ONCE
Refills: 0 | Status: COMPLETED | OUTPATIENT
Start: 2023-03-04 | End: 2023-03-04

## 2023-03-04 RX ORDER — DIPHENHYDRAMINE HCL 50 MG
25 CAPSULE ORAL ONCE
Refills: 0 | Status: COMPLETED | OUTPATIENT
Start: 2023-03-04 | End: 2023-03-04

## 2023-03-04 RX ORDER — ONDANSETRON 8 MG/1
4 TABLET, FILM COATED ORAL ONCE
Refills: 0 | Status: COMPLETED | OUTPATIENT
Start: 2023-03-04 | End: 2023-03-04

## 2023-03-04 RX ORDER — SODIUM CHLORIDE 9 MG/ML
100 INJECTION INTRAMUSCULAR; INTRAVENOUS; SUBCUTANEOUS ONCE
Refills: 0 | Status: COMPLETED | OUTPATIENT
Start: 2023-03-04 | End: 2023-03-04

## 2023-03-04 RX ORDER — ENOXAPARIN SODIUM 100 MG/ML
40 INJECTION SUBCUTANEOUS EVERY 24 HOURS
Refills: 0 | Status: DISCONTINUED | OUTPATIENT
Start: 2023-03-04 | End: 2023-03-06

## 2023-03-04 RX ORDER — ACETAMINOPHEN 500 MG
650 TABLET ORAL ONCE
Refills: 0 | Status: COMPLETED | OUTPATIENT
Start: 2023-03-04 | End: 2023-03-04

## 2023-03-04 RX ADMIN — Medication 25 MILLIGRAM(S): at 11:28

## 2023-03-04 RX ADMIN — Medication 100 GRAM(S): at 11:28

## 2023-03-04 RX ADMIN — Medication 15 MILLIGRAM(S): at 11:28

## 2023-03-04 RX ADMIN — Medication 15 MILLIGRAM(S): at 18:58

## 2023-03-04 RX ADMIN — SODIUM CHLORIDE 50 MILLILITER(S): 9 INJECTION INTRAMUSCULAR; INTRAVENOUS; SUBCUTANEOUS at 11:39

## 2023-03-04 RX ADMIN — ENOXAPARIN SODIUM 40 MILLIGRAM(S): 100 INJECTION SUBCUTANEOUS at 10:45

## 2023-03-04 RX ADMIN — Medication 650 MILLIGRAM(S): at 18:05

## 2023-03-04 RX ADMIN — Medication 10 MILLIGRAM(S): at 18:57

## 2023-03-04 RX ADMIN — Medication 650 MILLIGRAM(S): at 01:07

## 2023-03-04 RX ADMIN — Medication 650 MILLIGRAM(S): at 18:51

## 2023-03-04 RX ADMIN — Medication 100 GRAM(S): at 18:57

## 2023-03-04 RX ADMIN — Medication 15 MILLIGRAM(S): at 19:58

## 2023-03-04 RX ADMIN — ONDANSETRON 4 MILLIGRAM(S): 8 TABLET, FILM COATED ORAL at 12:31

## 2023-03-04 RX ADMIN — Medication 650 MILLIGRAM(S): at 11:44

## 2023-03-04 RX ADMIN — Medication 650 MILLIGRAM(S): at 10:44

## 2023-03-04 RX ADMIN — SODIUM CHLORIDE 50 MILLILITER(S): 9 INJECTION, SOLUTION INTRAVENOUS at 16:25

## 2023-03-04 RX ADMIN — ONDANSETRON 4 MILLIGRAM(S): 8 TABLET, FILM COATED ORAL at 10:45

## 2023-03-04 NOTE — PROVIDER CONTACT NOTE (OTHER) - ACTION/TREATMENT ORDERED:
As per ACP, continue to monitor. Pt is also receiving 100ml 0.9% NaCl bolus along with Mag sulfate for migraines as per MD orders. Continue to monitor pt if symptoms worsen.

## 2023-03-04 NOTE — PROGRESS NOTE ADULT - ASSESSMENT
32 y.o. F w/ a hx of migraines who presents with acute onset headache and L sided weakness.     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

## 2023-03-05 ENCOUNTER — TRANSCRIPTION ENCOUNTER (OUTPATIENT)
Age: 33
End: 2023-03-05

## 2023-03-05 PROCEDURE — 99232 SBSQ HOSP IP/OBS MODERATE 35: CPT

## 2023-03-05 RX ORDER — POLYETHYLENE GLYCOL 3350 17 G/17G
17 POWDER, FOR SOLUTION ORAL DAILY
Refills: 0 | Status: DISCONTINUED | OUTPATIENT
Start: 2023-03-05 | End: 2023-03-06

## 2023-03-05 RX ADMIN — Medication 650 MILLIGRAM(S): at 19:44

## 2023-03-05 RX ADMIN — ENOXAPARIN SODIUM 40 MILLIGRAM(S): 100 INJECTION SUBCUTANEOUS at 11:27

## 2023-03-05 RX ADMIN — Medication 650 MILLIGRAM(S): at 18:52

## 2023-03-05 NOTE — DISCHARGE NOTE PROVIDER - NSDCCPCAREPLAN_GEN_ALL_CORE_FT
PRINCIPAL DISCHARGE DIAGNOSIS  Diagnosis: Migraine  Assessment and Plan of Treatment: Your migraine resulted in difficulty speaking and weakness on your L side. Your MRI was negative for a stroke. We gave you medications as needed for your migraine. The neurologist recommended _________ for migraines and following up with a headache specialist.       PRINCIPAL DISCHARGE DIAGNOSIS  Diagnosis: Migraine  Assessment and Plan of Treatment: Your migraine resulted in difficulty speaking and weakness on your L side. Your MRI was negative for a stroke. We gave you medications as needed for your migraine. The neurologist recommended Sumatriptan for migraines and following up with a headache specialist.

## 2023-03-05 NOTE — DISCHARGE NOTE PROVIDER - HOSPITAL COURSE
32 y.o. F w/ a hx of migraines who presents with acute onset headache and L sided weakness. Patient code stroke on admission, CTH negative, was given tPA and observed in MICU. MRI negative for stroke, symptoms likely secondary to hemiplegic migraine. Patient's strength improved throughout hospitalization and on PT re-evaluation, recommended___________.   For migraines, patient was given migraine cocktail as an inpatient. Neurology recommended _________ and outpatient follow up with headache specialist. 32 y.o. F w/ a hx of migraines who presents with acute onset headache and L sided weakness. Patient code stroke on admission, CTH negative, was given tPA and observed in MICU. MRI negative for stroke, symptoms likely secondary to hemiplegic migraine. Patient's strength improved throughout hospitalization and on PT re-evaluation, recommended outpatient PT.   For migraines, patient was given migraine cocktail as an inpatient. Neurology recommended sumatriptan and outpatient follow up with headache specialist. 32 y.o. F w/ a hx of migraines who presents with acute onset headache and L sided weakness. Patient code stroke on admission, CTH negative, was given tPA and observed in MICU. MRI negative for stroke, symptoms likely secondary to hemiplegic migraine. Patient's strength improved throughout hospitalization, but still with persistent left hemiparesis on exam and on PT re-evaluation, recommended outpatient PT.   For migraines, patient was given migraine cocktail as an inpatient. Neurology recommended sumatriptan and outpatient follow up with headache specialist. Patient reported unsuccessful use with sumatriptan in the past. She uses excedrin at home which usually helps controlling her headache. Recommended close follow up with neurology given frequency and severity of her headache. She would be beneficial from migraine ppx as outpatient.

## 2023-03-05 NOTE — DISCHARGE NOTE PROVIDER - NSDCQMSTROKE_NEU_ALL_CORE
Assumed care of patient, bedside report received from Renee. Updated on POC, call light within reach and fall precautions in place. Bed locked and in lowest position. Patient instructed to call for assistance before getting out of bed. All questions answered, no other needs at this time.        No

## 2023-03-05 NOTE — DISCHARGE NOTE PROVIDER - NSDCMRMEDTOKEN_GEN_ALL_CORE_FT
clindamycin 150 mg oral capsule: 1 cap(s) orally every 8 hours  hydrocodone-homatropine 5 mg-1.5 mg oral tablet: 1 tab(s) orally every 6 hours, As Needed -for cough MDD:4 tablets   Tessalon Perles 100 mg oral capsule: 1 cap(s) orally 3 times a day   clindamycin 150 mg oral capsule: 1 cap(s) orally every 8 hours  hydrocodone-homatropine 5 mg-1.5 mg oral tablet: 1 tab(s) orally every 6 hours, As Needed -for cough MDD:4 tablets   outpatient physical therapy:   Tessalon Perles 100 mg oral capsule: 1 cap(s) orally 3 times a day

## 2023-03-05 NOTE — PROVIDER CONTACT NOTE (OTHER) - ASSESSMENT
IV site clean, dry, and intact, no swelling, redness, or pain when flushed.
Pt noticed to be weak on assessment, q4 vitals taken. First BP 96/49, 2nd 85/52. Manual BP taken as per ACP, resulted 102/62. Pt still feeling weak.

## 2023-03-05 NOTE — DISCHARGE NOTE PROVIDER - CARE PROVIDER_API CALL
Franklin Costa)  Electrodiagnostic Medicine; Internal Medicine; Neurology  [address] [facility] [address]  [place], [place] [id number]  Phone: [phone]  Fax: [phone]  Follow Up Time:     Isaiah Aparicio [facility]  [address] [place] [address]  Phone: [phone]  Fax: [phone]  Follow Up Time:

## 2023-03-05 NOTE — DISCHARGE NOTE PROVIDER - NSDCCPTREATMENT_GEN_ALL_CORE_FT
PRINCIPAL PROCEDURE  Procedure: MRI  Findings and Treatment:   < end of copied text >  PROCEDURE DATE:  03/03/2023    INTERPRETATION:  INDICATIONS:  Status post stroke code  TECHNIQUE:  Multiplanar imaging was performed using T1 weighted, T2   weighted and FLAIR sequences.  Diffusion weighted and susceptibility   sensitive images were also obtained.  COMPARISON EXAMINATION: CT, CTA, CTP 2/28/2023  FINDINGS:  VENTRICLES AND SULCI:  Normal.  INTRA-AXIAL:  No mass, abnormal signal or evidence of acute cerebral   ischemia.  EXTRA-AXIAL:  No mass or collection.  VISUALIZED SINUSES:  Normal.  VISUALIZED MASTOIDS:  Clear.  CALVARIUM:  Normal.  CAROTID FLOW VOIDS:  Present.  MISCELLANEOUS:  None.  IMPRESSION:  Normal noncontrast MRI of the brain.  --- End of Report ---< from: MR Head No Cont (03.03.23 @ 12:56) >        SECONDARY PROCEDURE  Procedure: CTA head w/w/o contrast  Findings and Treatment: IMPRESSION:  Brain CT: No acute hemorrhage, mass effect, hydrocephalus or extra-axial   collections.  Neck CTA: No hemodynamically significant stenosis.  Brain CTA: No large vessel occlusion or stenosis.  Perfusion maps: No evidence for core infarct or area of brain at risk.

## 2023-03-05 NOTE — PROGRESS NOTE ADULT - ASSESSMENT
32 y.o. F w/ a hx of migraines who presents with acute onset headache and L sided weakness.     # Hemiplegic migraine   - MRI Head without evidence of stroke   - Suspect 2/2 hemiplegic migraine   - Neurology following, recc OP follow up  - PT/OT- home v rehab, will cont evaluation over weekend   - Cont regular diet  - TTE: Normal LV, neg bubble study    # Migraines   - D/C Escig   - PRN migraine/headache:  Can order 1L IVF + Mag + Toradol + Benadryl + Compazine   [ ] Asked Neuro to provide migraine management recommendations until patient follows up with headache as an OP, will give reccs    32 y.o. F w/ a hx of migraines who presents with acute onset headache and L sided weakness.     # Hemiplegic migraine c/b aphasia   - Code stroke on presentation, given tPA and monitored in the MICU   - MRI Head without evidence of stroke   - Suspect 2/2 hemiplegic migraine   - Neurology following, recc OP follow up  - PT/OT- home v rehab, will cont evaluation over weekend   - Cont regular diet  - TTE: Normal LV, neg bubble study    # Migraines   - D/C Escig   - PRN migraine/headache:  Can order 1L IVF + Mag + Toradol + Benadryl + Compazine   [ ] Asked Neuro to provide migraine management recommendations until patient follows up with headache as an OP, will give reccs

## 2023-03-05 NOTE — DISCHARGE NOTE PROVIDER - ATTENDING DISCHARGE PHYSICAL EXAMINATION:
GENERAL: no apparent distress  HEAD:  Atraumatic, Normocephalic  EYES: EOMI, PERRLA, conjunctiva and sclera clear b/l  CHEST/LUNG: on RA; Clear to auscultation bilaterally; No wheezing; No crackles  HEART: Regular rate and rhythm; S1/S2 wnl; no obvious murmurs  ABDOMEN: Soft, Nontender, Nondistended; Bowel sounds present  EXTREMITIES:  2+ Peripheral Pulses, No edema, Left Upper extremity and lower extremity strength 4/5.   PSYCH: normal affect, calm demeanor  NEUROLOGY: AAOX3, CN 2-12 grossly intact, no obvious FND

## 2023-03-05 NOTE — PROVIDER CONTACT NOTE (OTHER) - RECOMMENDATIONS
As per ACP, continue to monitor site for infection.
As per ACP, continue to monitor. Pt is also receiving 100ml 0.9% NaCl bolus along with Mag sulfate for migraines as per MD orders. Continue to monitor pt if symptoms worsen.

## 2023-03-05 NOTE — CHART NOTE - NSCHARTNOTEFT_GEN_A_CORE
Notified by Primary team patient to have continued headache refractory to headache cocktail.     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 MRI negative for acute infarct, likely migraine with aura.     Recommendations:  [] Sumatriptan 100mg PO and can repeat again in 2 hours if persistent headache   [] Follow up with Dr. Costa as outpatient at [address] [place] [address] [phone].     Discussed with Dr. Ortega attending. Notified by Primary team patient to have continued headache refractory to headache cocktail.     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 MRI negative for acute infarct, likely migraine with aura.     Recommendations:  [] Sumatriptan 100mg PO and can repeat again in 2 hours if persistent headache   [] Follow up with Dr. Costa as outpatient at [address] [place] [address] [phone].     Discussed with Dr. Ortega attending.  Thank you

## 2023-03-05 NOTE — PROVIDER CONTACT NOTE (OTHER) - SITUATION
Pt noticed to be weak on assessment, q4 vitals taken. First BP 96/49, 2nd 85/52.
Pt due for IV change, pt refusing.

## 2023-03-06 ENCOUNTER — TRANSCRIPTION ENCOUNTER (OUTPATIENT)
Age: 33
End: 2023-03-06

## 2023-03-06 VITALS
RESPIRATION RATE: 16 BRPM | SYSTOLIC BLOOD PRESSURE: 122 MMHG | DIASTOLIC BLOOD PRESSURE: 77 MMHG | HEART RATE: 77 BPM | OXYGEN SATURATION: 100 % | TEMPERATURE: 98 F

## 2023-03-06 DIAGNOSIS — G43.401 HEMIPLEGIC MIGRAINE, NOT INTRACTABLE, WITH STATUS MIGRAINOSUS: ICD-10-CM

## 2023-03-06 PROCEDURE — 99239 HOSP IP/OBS DSCHRG MGMT >30: CPT

## 2023-03-06 PROCEDURE — 99232 SBSQ HOSP IP/OBS MODERATE 35: CPT

## 2023-03-06 RX ORDER — KETOROLAC TROMETHAMINE 30 MG/ML
10 SYRINGE (ML) INJECTION ONCE
Refills: 0 | Status: DISCONTINUED | OUTPATIENT
Start: 2023-03-06 | End: 2023-03-06

## 2023-03-06 RX ORDER — KETOROLAC TROMETHAMINE 30 MG/ML
1 SYRINGE (ML) INJECTION
Qty: 21 | Refills: 0
Start: 2023-03-06 | End: 2023-03-12

## 2023-03-06 RX ORDER — SUMATRIPTAN SUCCINATE 4 MG/.5ML
1 INJECTION, SOLUTION SUBCUTANEOUS
Qty: 30 | Refills: 0
Start: 2023-03-06

## 2023-03-06 RX ORDER — ACETAMINOPHEN 500 MG
2 TABLET ORAL
Qty: 0 | Refills: 0 | DISCHARGE
Start: 2023-03-06

## 2023-03-06 RX ADMIN — Medication 10 MILLIGRAM(S): at 10:45

## 2023-03-06 RX ADMIN — Medication 650 MILLIGRAM(S): at 07:05

## 2023-03-06 RX ADMIN — Medication 10 MILLIGRAM(S): at 09:43

## 2023-03-06 RX ADMIN — Medication 650 MILLIGRAM(S): at 06:05

## 2023-03-06 NOTE — DISCHARGE NOTE NURSING/CASE MANAGEMENT/SOCIAL WORK - NSDCPEFALRISK_GEN_ALL_CORE
For information on Fall & Injury Prevention, visit: [email or web address] OR  [email or web address] OR  https://www.cdc.gov/steadi/patient.html

## 2023-03-06 NOTE — CHART NOTE - NSCHARTNOTEFT_GEN_A_CORE
patient seen and examined today.   had headache this morning, improved after tylenol and Toradol PO.   patient with persistent left upper and lower ext weakness 4/5 strength on exam.   d/c home today with outpatient PT.   follow up with neurology in office.   refer to dc sum from 3/5/23    CAPILLARY BLOOD GLUCOSE                      Vital Signs Last 24 Hrs  T(C): 36.7 (06 Mar 2023 08:00), Max: 37.4 (05 Mar 2023 16:00)  T(F): 98 (06 Mar 2023 08:00), Max: 99.3 (05 Mar 2023 16:00)  HR: 79 (06 Mar 2023 08:00) (75 - 83)  BP: 108/69 (06 Mar 2023 10:40) (107/65 - 124/67)  BP(mean): --  RR: 16 (06 Mar 2023 08:00) (16 - 18)  SpO2: 100% (06 Mar 2023 08:00) (97% - 100%)    Parameters below as of 06 Mar 2023 08:00  Patient On (Oxygen Delivery Method): room air

## 2023-03-06 NOTE — PROGRESS NOTE ADULT - SUBJECTIVE AND OBJECTIVE BOX
INTERVAL HPI/OVERNIGHT EVENTS: NAEON    SUBJECTIVE: Patient seen and examined at bedside. A&O x 3. No complaints of pain or HA.     OBJECTIVE:    VITAL SIGNS:  ICU Vital Signs Last 24 Hrs  T(C): 36.5 (02 Mar 2023 08:00), Max: 36.7 (01 Mar 2023 16:00)  T(F): 97.7 (02 Mar 2023 08:00), Max: 98.1 (01 Mar 2023 16:00)  HR: 95 (02 Mar 2023 12:00) (66 - 95)  BP: 110/85 (02 Mar 2023 12:00) (101/73 - 133/93)  BP(mean): 90 (02 Mar 2023 12:00) (69 - 102)  ABP: --  ABP(mean): --  RR: 15 (02 Mar 2023 12:00) (14 - 27)  SpO2: 100% (02 Mar 2023 12:00) (95% - 100%)    O2 Parameters below as of 02 Mar 2023 08:00  Patient On (Oxygen Delivery Method): room air              03-01 @ 07:01  -  03-02 @ 07:00  --------------------------------------------------------  IN: 600 mL / OUT: 1000 mL / NET: -400 mL    03-02 @ 07:01  -  03-02 @ 12:15  --------------------------------------------------------  IN: 240 mL / OUT: 450 mL / NET: -210 mL      CAPILLARY BLOOD GLUCOSE          PHYSICAL EXAM:  GENERAL: NAD. Well-developed.  NEUROLOGICAL: A&O x 4; CN2-12 grossly intact, noted difficulty w/ speech; 3-4/5 strength on left UE, 2-3/5 strength on left LE; 5/5 strength on RUE/RUE; sensation intact on both side              NECK: No lymphadenopathy. Supple, symmetric and without tracheal deviation.   CARDIAC: RRR w/ normal S1 & S2. No murmurs, rubs. & gallops. Radial & dorsal pedis pulses intact. No carotid bruits.   PULMONARY: CTA b/l w/o accessory muscle use.   GI: Soft, NT, ND, no rebound, no guarding. NABS in 4 quads. No palpable masses. No hepatosplenomegaly. No hernia visualized. No excessive scarring. Negative lei , psoas, obturator signs.   RENAL: No lower extremity edema. No CVA tenderness.   PSYCH: Appropriate insight/judgment    MEDICATIONS:  MEDICATIONS  (STANDING):  heparin   Injectable 5000 Unit(s) SubCutaneous every 8 hours    MEDICATIONS  (PRN):  acetaminophen 325 mG/butalbital 50 mG/caffeine 40 mG 1 Tablet(s) Oral every 4 hours PRN migraine headache      ALLERGIES:  Allergies    No Known Allergies    Intolerances        LABS:                        12.2   5.29  )-----------( 266      ( 02 Mar 2023 00:20 )             38.8     03-02    136  |  106  |  14  ----------------------------<  93  4.2   |  22  |  0.76    Ca    8.4      02 Mar 2023 00:20  Phos  3.3     03-02  Mg     2.40     03-02    TPro  6.3  /  Alb  3.3  /  TBili  0.2  /  DBili  x   /  AST  12  /  ALT  10  /  AlkPhos  27<L>  03-02    PT/INR - ( 02 Mar 2023 00:20 )   PT: 14.0 sec;   INR: 1.20 ratio         PTT - ( 02 Mar 2023 00:20 )  PTT:30.9 sec      RADIOLOGY & ADDITIONAL TESTS: Reviewed.
INTERVAL HPI/OVERNIGHT EVENTS: O/n HA; resolved w/ tylenol. No associated auras.     SUBJECTIVE: Patient seen and examined at bedside. States strength improving on left side. Stating intermittent HA that resolves w/ tylenol.     OBJECTIVE:     VITAL SIGNS:  ICU Vital Signs Last 24 Hrs  T(C): 36.4 (01 Mar 2023 04:00), Max: 37.2 (28 Feb 2023 17:12)  T(F): 97.5 (01 Mar 2023 04:00), Max: 99 (28 Feb 2023 17:12)  HR: 72 (01 Mar 2023 07:00) (67 - 103)  BP: 123/84 (01 Mar 2023 07:00) (101/63 - 143/88)  BP(mean): 93 (01 Mar 2023 07:00) (68 - 103)  ABP: --  ABP(mean): --  RR: 20 (01 Mar 2023 07:00) (13 - 27)  SpO2: 100% (01 Mar 2023 07:00) (93% - 100%)    O2 Parameters below as of 01 Mar 2023 06:00  Patient On (Oxygen Delivery Method): room air              02-28 @ 07:01  -  03-01 @ 07:00  --------------------------------------------------------  IN: 150 mL / OUT: 900 mL / NET: -750 mL      CAPILLARY BLOOD GLUCOSE        PHYSICAL EXAM:  GENERAL: NAD. Well-developed.  NEUROLOGICAL: A&O x 4; CN2-12 grossly intact, noted difficulty w/ speech; 3-4/5 strength on left UE, 2-3/5 strength on left LE; 5/5 strength on RUE/RUE; sensation intact on both side              NECK: No lymphadenopathy. Supple, symmetric and without tracheal deviation.   CARDIAC: RRR w/ normal S1 & S2. No murmurs, rubs. & gallops. Radial & dorsal pedis pulses intact. No carotid bruits.   PULMONARY: CTA b/l w/o accessory muscle use.   GI: Soft, NT, ND, no rebound, no guarding. NABS in 4 quads. No palpable masses. No hepatosplenomegaly. No hernia visualized. No excessive scarring. Negative lei , psoas, obturator signs.   RENAL: No lower extremity edema. No CVA tenderness.   PSYCH: Appropriate insight/judgment    MEDICATIONS:  MEDICATIONS  (STANDING):  lactated ringers. 1000 milliLiter(s) (75 mL/Hr) IV Continuous <Continuous>    MEDICATIONS  (PRN):      ALLERGIES:  Allergies    No Known Allergies    Intolerances        LABS:                        12.2   6.82  )-----------( 260      ( 01 Mar 2023 00:55 )             38.3     03-01    137  |  107  |  12  ----------------------------<  85  3.8   |  21<L>  |  0.61    Ca    8.4      01 Mar 2023 00:55  Phos  3.7     03-01  Mg     1.90     03-01    TPro  6.4  /  Alb  3.4  /  TBili  0.2  /  DBili  x   /  AST  11  /  ALT  9   /  AlkPhos  26<L>  03-01    PT/INR - ( 01 Mar 2023 00:55 )   PT: 14.3 sec;   INR: 1.23 ratio         PTT - ( 01 Mar 2023 00:55 )  PTT:29.3 sec      RADIOLOGY & ADDITIONAL TESTS: Reviewed.
Merlin Mathew, MD   Hospitalist  Pager #[phone]    PROGRESS NOTE:     Patient is a 32y old  Female who presents with a chief complaint of Stroke (03 Mar 2023 15:19)      SUBJECTIVE / OVERNIGHT EVENTS:   Headache this morning, vomited. Was given Tylenol and Zofran with improvement of pain from 7 to 3 and improvement of nausea but not resolution of sx.   Feels weak on L side, feels difficulty ambulating. Would prefer to go home rather than rehab but will reconsider pending PT session.       ADDITIONAL REVIEW OF SYSTEMS:    MEDICATIONS  (STANDING):  heparin   Injectable 5000 Unit(s) SubCutaneous every 8 hours    MEDICATIONS  (PRN):  acetaminophen 325 mG/butalbital 50 mG/caffeine 40 mG 1 Tablet(s) Oral every 4 hours PRN migraine headache      CAPILLARY BLOOD GLUCOSE        I&O's Summary    02 Mar 2023 07:01  -  03 Mar 2023 07:00  --------------------------------------------------------  IN: 820 mL / OUT: 450 mL / NET: 370 mL        PHYSICAL EXAM:  Vital Signs Last 24 Hrs  T(C): 37 (03 Mar 2023 16:00), Max: 37 (03 Mar 2023 16:00)  T(F): 98.6 (03 Mar 2023 16:00), Max: 98.6 (03 Mar 2023 16:00)  HR: 73 (03 Mar 2023 16:00) (72 - 77)  BP: 113/62 (03 Mar 2023 16:00) (103/60 - 122/78)  BP(mean): 81 (02 Mar 2023 20:00) (81 - 81)  RR: 18 (03 Mar 2023 16:00) (18 - 25)  SpO2: 100% (03 Mar 2023 16:00) (99% - 100%)    Parameters below as of 03 Mar 2023 16:00  Patient On (Oxygen Delivery Method): room air        CONSTITUTIONAL: NAD, well-developed woman   RESPIRATORY: Normal respiratory effort; lungs are clear to auscultation bilaterally  CARDIOVASCULAR: Regular rate and rhythm, normal S1 and S2, no murmur/rub/gallop; No lower extremity edema; Peripheral pulses are 2+ bilaterally  ABDOMEN: Nontender to palpation, normoactive bowel sounds, no rebound/guarding; No hepatosplenomegaly  MUSCULOSKELETAL no clubbing or cyanosis of digits; no joint swelling or tenderness to palpation  NEURO: CN 2-12 intact, 5/5 strength in RUE and RLE, 4+/5 strength in LUE, 4-/5 strength in LLE, sensation intact   PSYCH: A+O to person, place, and time; affect appropriate    LABS:                        12.3   5.93  )-----------( 280      ( 03 Mar 2023 05:36 )             39.6     03-03    137  |  106  |  18  ----------------------------<  88  4.3   |  22  |  0.69    Ca    9.0      03 Mar 2023 05:36  Phos  3.5     03-03  Mg     1.90     03-03    TPro  6.7  /  Alb  3.6  /  TBili  <0.2  /  DBili  x   /  AST  11  /  ALT  9   /  AlkPhos  28<L>  03-03    PT/INR - ( 02 Mar 2023 16:16 )   PT: 13.2 sec;   INR: 1.14 ratio         PTT - ( 02 Mar 2023 16:16 )  PTT:30.7 sec            RADIOLOGY & ADDITIONAL TESTS:  Results Reviewed:   Imaging Personally Reviewed:  Electrocardiogram Personally Reviewed:    COORDINATION OF CARE:  Care Discussed with Consultants/Other Providers [Y/N]:  Prior or Outpatient Records Reviewed [Y/N]:  
Patient is a 32y old  Female who presents with a chief complaint of Stroke (05 Mar 2023 15:25)      HPI:  32F w/ PMH of migraines (unassociated w/ auras) admitted for AMS. 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 Around 16:15 she began experiencing sudden onset word finding difficulty to the point of being almost completely unable to speak. RRT was called for evaluation. Rapidly transferred down to the ED's ambulance bay for a code stroke to be called at 16:47. Patient only endorsing HA & difficulty speaking and only able to communicate through nodding/shaking head "no," or fragmentary language. She denies a episode like this in the past. Mother was called on phone as collateral, discussed current presentation which mother agrees has never happened before but she does have a history of migraine. Absolute contraindications to tenecteplase were ruled out by history with mother & patient. Risks/benefits discussed with mother who understood and was consenting to receive thrombolytic treatment. Patient denies possibility of pregnancy and reported that her menstrual period was recently but unable to state when.     ED relevant for HDS & afebrile. CBC & CMP w/o acute pathology. Brain CT: No acute hemorrhage, mass effect, hydrocephalus or extra-axial collections. Neck CTA: No hemodynamically significant stenosis. Brain CTA: No large vessel occlusion or stenosis. Received tnk at 17:12. Transferred to MICU for closer monitoring.  (28 Feb 2023 18:18)    L sided weakness slowly improving. Patient is L handed.    REVIEW OF SYSTEMS   headache  dizzy    PAST MEDICAL & SURGICAL HISTORY   Migraine  No significant past surgical history    CURRENT FUNCTIONAL STATUS  3/5   Bed Mobility  Bed Mobility Training Rehab Potential: good, to achieve stated therapy goals  Bed Mobility Training Symptoms Noted During/After Treatment: none  Bed Mobility Training Supine-to-Sit: independent;  bed rails;  supervsion;  supervision    Sit-Stand Transfer Training  Sit-to-Stand Transfer Training Rehab Potential: good, to achieve stated therapy goals  Sit-to-Stand Transfer Training Symptoms Noted During/After Treatment: none  Transfer Training Sit-to-Stand Transfer: contact guard;  verbal cues;  1 person assist;  full weight-bearing   x2 reps. 2nd rep supervision  Transfer Training Stand-to-Sit Transfer: contact guard;  verbal cues;  full weight-bearing  Sit-to-Stand Transfer Training Transfer Safety Analysis: decreased balance;  decreased weight-shifting ability;  decreased ROM;  decreased strength;  impaired balance;  impaired coordination    Gait Training  Gait Training Rehab Potential: good, to achieve stated therapy goals  Gait Training Symptoms Noted During/After Treatment: none  Gait Training: contact guard;  verbal cues;  full weight-bearing   100 feet  Gait Analysis: 2-point gait   decreased arm swing;  decreased lucian;  decreased step length;  decreased stride length;  decreased weight-shifting ability;  decreased ROM;  decreased strength;  impaired balance;  impaired motor control;  100 feet  Gait Number of Times:: x 1  Type of Rest Type of Rest: standing  Duration of Rest Duration of Rest: 10-15 seconds     Stair Training  Physical Assist/Nonphysical Assist: 1 person assist  Weight-Bearing Restrictions: full weight-bearing  Assistive Device: right rail up, left rail down  Number of Stairs: 8       FAMILY HISTORY   No pertinent family history in first degree relatives        VITALS  T(C): 36.7 (03-06-23 @ 04:00), Max: 37.4 (03-05-23 @ 16:00)  HR: 78 (03-06-23 @ 04:00) (75 - 85)  BP: 108/69 (03-06-23 @ 10:40) (105/61 - 123/75)  RR: 16 (03-06-23 @ 04:00) (16 - 18)  SpO2: 100% (03-06-23 @ 04:00) (97% - 100%)  Wt(kg): --    ALLERGIES  No Known Allergies      MEDICATIONS   acetaminophen     Tablet .. 650 milliGRAM(s) Oral every 6 hours PRN  enoxaparin Injectable 40 milliGRAM(s) SubCutaneous every 24 hours  ondansetron    Tablet 4 milliGRAM(s) Oral every 12 hours PRN  polyethylene glycol 3350 17 Gram(s) Oral daily      ----------------------------------------------------------------------------------------  PHYSICAL EXAM  Constitutional - NAD, Comfortable  HEENT - NCAT, EOMI  Neck - Supple, No limited ROM  Chest - no respiratory distress  Cardiovascular - RRR, S1S2   Abdomen - Soft, NTND  Extremities - No C/C/E, No calf tenderness   Neurologic Exam -                    Cognitive - Awake, Alert, AAO to self, place, date, year, situation     Communication - Fluent, No dysarthria     Cranial Nerves - CN 2-12 intact     Motor -                     LEFT    UE - 4/5                    RIGHT UE - 5/5                    LEFT    LE - 4/5                    RIGHT LE -  5/5        Sensory - Intact to LT        Balance - WNL Static  Psychiatric - Mood stable, Affect WNL  ----------------------------------------------------------------------------------------  ASSESSMENT/PLAN  33 yo LHD f p/w L sided weakness due to migraine  MRI negative for cva  continue bedside PT and OT   diet: regular  DVT PPX - heparin  Rehab - ambulated 100' cg and climbed stairs yesterday.  recommend home with assistive device and outpatient PT and OT when medically cleared  can also follow up in outpatient PM&R if weakness persists [phone]
SUBJECTIVE/ INTERVAL HISTORY: MRI done, patient still with headache.     PAST MEDICAL & SURGICAL HISTORY:  Migraine    No significant past surgical history    No significant past surgical history    FAMILY HISTORY:    SOCIAL HISTORY:   T/E/D:   Occupation:   Lives with:     MEDICATIONS (HOME):  Home Medications:    MEDICATIONS  (STANDING):  heparin   Injectable 5000 Unit(s) SubCutaneous every 8 hours    MEDICATIONS  (PRN):  acetaminophen 325 mG/butalbital 50 mG/caffeine 40 mG 1 Tablet(s) Oral every 4 hours PRN migraine headache    ALLERGIES/INTOLERANCES:  Allergies  No Known Allergies    Intolerances    VITALS & EXAMINATION:  Vital Signs Last 24 Hrs  T(C): 36.9 (03 Mar 2023 12:00), Max: 36.9 (02 Mar 2023 20:00)  T(F): 98.4 (03 Mar 2023 12:00), Max: 98.4 (02 Mar 2023 20:00)  HR: 74 (03 Mar 2023 12:00) (72 - 82)  BP: 121/71 (03 Mar 2023 12:00) (103/60 - 122/78)  BP(mean): 81 (02 Mar 2023 20:00) (73 - 81)  RR: 18 (03 Mar 2023 12:00) (18 - 25)  SpO2: 100% (03 Mar 2023 12:00) (99% - 100%)    Parameters below as of 03 Mar 2023 12:00  Patient On (Oxygen Delivery Method): room air    General:  Constitutional: Female, appears stated age, in no apparent distress including pain  Head: Normocephalic & Atraumatic.  Respiratory: Breathing comfortably.  Extremities: No cyanosis, clubbing, or edema    Neurological:  MS: Awake, alert, oriented to person, place, situation, time. Follows all commands.  Language: Speech is clear, fluent with good repetition & comprehension.  CNs: PERRL (R = 3mm, L = 3mm). VFF. EOMI no nystagmus. V1-3 intact to LT b/l. No facial asymmetry b/l, full eye closure strength b/l. Hearing grossly normal (rubbing fingers) b/l. Tongue midline, normal movements, no atrophy.   Motor: Normal muscle bulk & tone. No noticeable tremor. No drifts UE b/l.  Drift LLE, no drift RLE.   Sensation: Intact to LT b/l throughout.   Cortical: Extinction on DSS (neglect): none  Coordination: No dysmetria to FTN b/l.   Gait: Deferred.     LABORATORY:  CBC                       12.3   5.93  )-----------( 280      ( 03 Mar 2023 05:36 )             39.6     Chem 03-03    137  |  106  |  18  ----------------------------<  88  4.3   |  22  |  0.69    Ca    9.0      03 Mar 2023 05:36  Phos  3.5     03-03  Mg     1.90     03-03    TPro  6.7  /  Alb  3.6  /  TBili  <0.2  /  DBili  x   /  AST  11  /  ALT  9   /  AlkPhos  28<L>  03-03    LFTs LIVER FUNCTIONS - ( 03 Mar 2023 05:36 )  Alb: 3.6 g/dL / Pro: 6.7 g/dL / ALK PHOS: 28 U/L / ALT: 9 U/L / AST: 11 U/L / GGT: x           Coagulopathy PT/INR - ( 02 Mar 2023 16:16 )   PT: 13.2 sec;   INR: 1.14 ratio         PTT - ( 02 Mar 2023 16:16 )  PTT:30.7 sec  Lipid Panel 03-02 Chol 183 LDL -- HDL 35<L> Trig 82  A1c   Cardiac enzymes     U/A   CSF  Immunological  Other    Radiology (XR, CT, MR, U/S, TTE/BRYNN):    MRI Head (3/3/23):   Normal noncontrast MRI of the brain.    CTH (3/1/23 @17:00):  No CT evidence of acute intracranial hemorrhage, brain edema, or mass effect.    CTH (3/1/23 @ 10:37):  Unremarkable noncontrast head CT.    (2/28/23):  Brain CT: No acute hemorrhage, mass effect, hydrocephalus or extra-axial collections.    Neck CTA: No hemodynamically significant stenosis.    Brain CTA: No large vessel occlusion or stenosis.    Perfusion maps: No evidence for core infarct or area of brain at risk.
Merlin Mathew, MD   Hospitalist  Pager #[phone]    PROGRESS NOTE:     Patient is a 32y old  Female who presents with a chief complaint of Stroke (03 Mar 2023 17:03)      SUBJECTIVE / OVERNIGHT EVENTS: Patient had a headache this AM, felt nauseous, has not been able to tolerate PO   Willing to try migraine cocktail     ADDITIONAL REVIEW OF SYSTEMS:    MEDICATIONS  (STANDING):  enoxaparin Injectable 40 milliGRAM(s) SubCutaneous every 24 hours    MEDICATIONS  (PRN):  acetaminophen     Tablet .. 650 milliGRAM(s) Oral every 6 hours PRN Temp greater or equal to 38C (100.4F), Mild Pain (1 - 3), Moderate Pain (4 - 6)  ondansetron    Tablet 4 milliGRAM(s) Oral every 12 hours PRN Nausea and/or Vomiting      CAPILLARY BLOOD GLUCOSE        I&O's Summary      PHYSICAL EXAM:  Vital Signs Last 24 Hrs  T(C): 36.8 (04 Mar 2023 12:00), Max: 37.6 (04 Mar 2023 08:00)  T(F): 98.3 (04 Mar 2023 12:00), Max: 99.6 (04 Mar 2023 08:00)  HR: 63 (04 Mar 2023 12:00) (63 - 83)  BP: 102/62 (04 Mar 2023 12:00) (102/62 - 114/65)  BP(mean): --  RR: 16 (04 Mar 2023 12:00) (16 - 19)  SpO2: 100% (04 Mar 2023 12:00) (98% - 100%)    Parameters below as of 04 Mar 2023 12:00  Patient On (Oxygen Delivery Method): room air        CONSTITUTIONAL: NAD, well-developed  RESPIRATORY: Normal respiratory effort; lungs are clear to auscultation bilaterally  CARDIOVASCULAR: Regular rate and rhythm, normal S1 and S2, no murmur/rub/gallop; No lower extremity edema; Peripheral pulses are 2+ bilaterally  ABDOMEN: Nontender to palpation, normoactive bowel sounds, no rebound/guarding; No hepatosplenomegaly  MUSCULOSKELETAL no clubbing or cyanosis of digits; no joint swelling or tenderness to palpation, 4+/5 strength in UE/LE   PSYCH: A+O to person, place, and time; affect appropriate    LABS:                        12.3   5.93  )-----------( 280      ( 03 Mar 2023 05:36 )             39.6     03-03    137  |  106  |  18  ----------------------------<  88  4.3   |  22  |  0.69    Ca    9.0      03 Mar 2023 05:36  Phos  3.5     03-03  Mg     1.90     03-03    TPro  6.7  /  Alb  3.6  /  TBili  <0.2  /  DBili  x   /  AST  11  /  ALT  9   /  AlkPhos  28<L>  03-03    PT/INR - ( 02 Mar 2023 16:16 )   PT: 13.2 sec;   INR: 1.14 ratio         PTT - ( 02 Mar 2023 16:16 )  PTT:30.7 sec            RADIOLOGY & ADDITIONAL TESTS:  Results Reviewed:   Imaging Personally Reviewed:  Electrocardiogram Personally Reviewed:    COORDINATION OF CARE:  Care Discussed with Consultants/Other Providers [Y/N]:  Prior or Outpatient Records Reviewed [Y/N]:  
Merlin Mathew, MD   Hospitalist  Pager #[phone]    PROGRESS NOTE:     Patient is a 32y old  Female who presents with a chief complaint of Stroke (05 Mar 2023 12:42)      SUBJECTIVE / OVERNIGHT EVENTS: NEON   Patient feels better today- took migraine cocktail twice yest with improvement in headache   No headache today, able to eat some breakfast   Feels stronger     ADDITIONAL REVIEW OF SYSTEMS:    MEDICATIONS  (STANDING):  enoxaparin Injectable 40 milliGRAM(s) SubCutaneous every 24 hours  lactated ringers. 1000 milliLiter(s) (50 mL/Hr) IV Continuous <Continuous>  polyethylene glycol 3350 17 Gram(s) Oral daily    MEDICATIONS  (PRN):  acetaminophen     Tablet .. 650 milliGRAM(s) Oral every 6 hours PRN Temp greater or equal to 38C (100.4F), Mild Pain (1 - 3), Moderate Pain (4 - 6)  ondansetron    Tablet 4 milliGRAM(s) Oral every 12 hours PRN Nausea and/or Vomiting      CAPILLARY BLOOD GLUCOSE        I&O's Summary      PHYSICAL EXAM:  Vital Signs Last 24 Hrs  T(C): 36.7 (05 Mar 2023 12:00), Max: 36.9 (05 Mar 2023 04:00)  T(F): 98 (05 Mar 2023 12:00), Max: 98.4 (05 Mar 2023 04:00)  HR: 85 (05 Mar 2023 12:00) (64 - 85)  BP: 105/61 (05 Mar 2023 12:00) (98/61 - 119/74)  BP(mean): --  RR: 18 (05 Mar 2023 12:00) (16 - 19)  SpO2: 100% (05 Mar 2023 12:00) (100% - 100%)    Parameters below as of 05 Mar 2023 12:00  Patient On (Oxygen Delivery Method): room air      CONSTITUTIONAL: NAD, well-developed  RESPIRATORY: Normal respiratory effort; lungs are clear to auscultation bilaterally  CARDIOVASCULAR: Regular rate and rhythm, normal S1 and S2, no murmur/rub/gallop; No lower extremity edema; Peripheral pulses are 2+ bilaterally  ABDOMEN: Nontender to palpation, normoactive bowel sounds, no rebound/guarding; No hepatosplenomegaly  MUSCULOSKELETAL no clubbing or cyanosis of digits; no joint swelling or tenderness to palpation, 4+/5 strength in UE/LE   PSYCH: A+O to person, place, and time; affect appropriate    LABS:                      RADIOLOGY & ADDITIONAL TESTS:  Results Reviewed:   Imaging Personally Reviewed:  Electrocardiogram Personally Reviewed:    COORDINATION OF CARE:  Care Discussed with Consultants/Other Providers [Y/N]:  Prior or Outpatient Records Reviewed [Y/N]:

## 2023-03-06 NOTE — CHART NOTE - NSCHARTNOTESELECT_GEN_ALL_CORE
Discharge/Event Note
MAR/Event Note
Event Note
MICU Transfer Note/Transfer Note
Neurology/Event Note

## 2023-03-06 NOTE — DISCHARGE NOTE NURSING/CASE MANAGEMENT/SOCIAL WORK - PATIENT PORTAL LINK FT
You can access the FollowMyHealth Patient Portal offered by [facility] by registering at the following website: [email or web address]. By joining [facility]’s FollowMyHealth portal, you will also be able to view your health information using other applications (apps) compatible with our system.

## 2023-03-09 PROBLEM — G43.909 MIGRAINE, UNSPECIFIED, NOT INTRACTABLE, WITHOUT STATUS MIGRAINOSUS: Chronic | Status: ACTIVE | Noted: 2023-02-28

## 2023-03-10 ENCOUNTER — APPOINTMENT (OUTPATIENT)
Dept: NEUROLOGY | Facility: CLINIC | Age: 33
End: 2023-03-10
Payer: COMMERCIAL

## 2023-03-10 ENCOUNTER — NON-APPOINTMENT (OUTPATIENT)
Age: 33
End: 2023-03-10

## 2023-03-10 VITALS
WEIGHT: 190 LBS | HEART RATE: 100 BPM | SYSTOLIC BLOOD PRESSURE: 136 MMHG | BODY MASS INDEX: 33.66 KG/M2 | DIASTOLIC BLOOD PRESSURE: 85 MMHG | HEIGHT: 63 IN

## 2023-03-10 DIAGNOSIS — G43.109 MIGRAINE WITH AURA, NOT INTRACTABLE, W/OUT STATUS MIGRAINOSUS: ICD-10-CM

## 2023-03-10 PROCEDURE — 99215 OFFICE O/P EST HI 40 MIN: CPT

## 2023-03-10 RX ORDER — SUMATRIPTAN 100 MG/1
100 TABLET, FILM COATED ORAL
Refills: 0 | Status: DISCONTINUED | COMMUNITY

## 2023-03-10 RX ORDER — ZONISAMIDE 100 MG/1
100 CAPSULE ORAL
Qty: 30 | Refills: 2 | Status: ACTIVE | COMMUNITY
Start: 2023-03-10 | End: 1900-01-01

## 2023-03-11 NOTE — ASSESSMENT
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  Prior to onset of this complicated migraine she has had frequent migraines over the past year and she would benefit from prophylaxis with zonisamide.  This carbonic anhydrase inhibitor, similar to Topamax, but without adverse cognitive side effects has been used off label for migraine prophylaxis.  Also advised to start coenzyme Q 10 200 mg daily for additional migraine prophylaxis.  Potential risks of zonisamide and side effects discussed which includes weight loss.  For recurrence of migraine advised to take 2 Aleve tablets at onset of headache.  She was advised to contact me if she notes any adverse effects.  Migraine triggers and avoidance reviewed. \par \par  She was told that I am leaving [facility] at the end of this month and I advised to follow-up with our headache specialist, Dr. Carrington Gandhi.

## 2023-03-11 NOTE — HISTORY OF PRESENT ILLNESS
[FreeTextEntry1] : 32-year-old left-handed woman with history of migraines since adolescence which are often characterized by an aura of paresthesia in her fingers as noted in increased frequency of migraine headaches over the past year and has approximately 4 headaches per month which may last at least 2 days.  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  This was witnessed by colleagues and she was brought down to the emergency room where a stroke code was called.  She had CT scan of the head and CT angiogram all reporting no abnormalities and she received tenecteplase.  MRI of brain obtained 3 days later showed no evidence of infarction.  Her speech returned to normal and her left-sided weakness gradually improved but still has  some mild left-sided weakness.  Denies any sensory disturbance.

## 2023-03-11 NOTE — PHYSICAL EXAM
[FreeTextEntry1] : Alert and oriented. No cognitive or communication deficits. Visual fields are full to confrontation. Optic disc margins are sharp. Pupils equal and constrict to light. Extraocular movements intact. No facial asymmetry. Hearing intact to finger rub. Palate rises symmetrically and tongue protrudes in midline. Neck is supple. No bruits heard.  She has a mild left upper limb drift.  No focal sensory loss.  Tendon reflexes are all active and symmetric. Plantars are flexor. Gait and coordination intact. Heart sounds are normal. No murmurs heard.\par

## 2023-04-04 ENCOUNTER — TRANSCRIPTION ENCOUNTER (OUTPATIENT)
Age: 33
End: 2023-04-04

## 2023-04-04 ENCOUNTER — APPOINTMENT (OUTPATIENT)
Dept: NEUROLOGY | Facility: CLINIC | Age: 33
End: 2023-04-04

## 2023-05-09 ENCOUNTER — APPOINTMENT (OUTPATIENT)
Dept: PAIN MANAGEMENT | Facility: CLINIC | Age: 33
End: 2023-05-09

## 2023-12-03 ENCOUNTER — EMERGENCY (EMERGENCY)
Facility: HOSPITAL | Age: 33
LOS: 1 days | Discharge: HOME | End: 2023-12-03
Attending: STUDENT IN AN ORGANIZED HEALTH CARE EDUCATION/TRAINING PROGRAM | Admitting: EMERGENCY MEDICINE
Payer: COMMERCIAL

## 2023-12-03 VITALS
HEART RATE: 100 BPM | SYSTOLIC BLOOD PRESSURE: 113 MMHG | OXYGEN SATURATION: 100 % | RESPIRATION RATE: 14 BRPM | DIASTOLIC BLOOD PRESSURE: 78 MMHG

## 2023-12-03 VITALS
RESPIRATION RATE: 18 BRPM | HEART RATE: 125 BPM | TEMPERATURE: 98 F | OXYGEN SATURATION: 100 % | SYSTOLIC BLOOD PRESSURE: 124 MMHG | DIASTOLIC BLOOD PRESSURE: 89 MMHG

## 2023-12-03 LAB
ALBUMIN SERPL ELPH-MCNC: 4.5 G/DL — SIGNIFICANT CHANGE UP (ref 3.3–5)
ALBUMIN SERPL ELPH-MCNC: 4.5 G/DL — SIGNIFICANT CHANGE UP (ref 3.3–5)
ALP SERPL-CCNC: 40 U/L — SIGNIFICANT CHANGE UP (ref 40–120)
ALP SERPL-CCNC: 40 U/L — SIGNIFICANT CHANGE UP (ref 40–120)
ALT FLD-CCNC: 11 U/L — SIGNIFICANT CHANGE UP (ref 4–33)
ALT FLD-CCNC: 11 U/L — SIGNIFICANT CHANGE UP (ref 4–33)
ANION GAP SERPL CALC-SCNC: 13 MMOL/L — SIGNIFICANT CHANGE UP (ref 7–14)
ANION GAP SERPL CALC-SCNC: 13 MMOL/L — SIGNIFICANT CHANGE UP (ref 7–14)
ANISOCYTOSIS BLD QL: SLIGHT — SIGNIFICANT CHANGE UP
ANISOCYTOSIS BLD QL: SLIGHT — SIGNIFICANT CHANGE UP
APTT BLD: 32.8 SEC — SIGNIFICANT CHANGE UP (ref 24.5–35.6)
APTT BLD: 32.8 SEC — SIGNIFICANT CHANGE UP (ref 24.5–35.6)
AST SERPL-CCNC: 16 U/L — SIGNIFICANT CHANGE UP (ref 4–32)
AST SERPL-CCNC: 16 U/L — SIGNIFICANT CHANGE UP (ref 4–32)
BASOPHILS # BLD AUTO: 0.11 K/UL — SIGNIFICANT CHANGE UP (ref 0–0.2)
BASOPHILS # BLD AUTO: 0.11 K/UL — SIGNIFICANT CHANGE UP (ref 0–0.2)
BASOPHILS NFR BLD AUTO: 1.7 % — SIGNIFICANT CHANGE UP (ref 0–2)
BASOPHILS NFR BLD AUTO: 1.7 % — SIGNIFICANT CHANGE UP (ref 0–2)
BILIRUB SERPL-MCNC: 0.3 MG/DL — SIGNIFICANT CHANGE UP (ref 0.2–1.2)
BILIRUB SERPL-MCNC: 0.3 MG/DL — SIGNIFICANT CHANGE UP (ref 0.2–1.2)
BUN SERPL-MCNC: 12 MG/DL — SIGNIFICANT CHANGE UP (ref 7–23)
BUN SERPL-MCNC: 12 MG/DL — SIGNIFICANT CHANGE UP (ref 7–23)
CALCIUM SERPL-MCNC: 9.9 MG/DL — SIGNIFICANT CHANGE UP (ref 8.4–10.5)
CALCIUM SERPL-MCNC: 9.9 MG/DL — SIGNIFICANT CHANGE UP (ref 8.4–10.5)
CHLORIDE SERPL-SCNC: 98 MMOL/L — SIGNIFICANT CHANGE UP (ref 98–107)
CHLORIDE SERPL-SCNC: 98 MMOL/L — SIGNIFICANT CHANGE UP (ref 98–107)
CO2 SERPL-SCNC: 25 MMOL/L — SIGNIFICANT CHANGE UP (ref 22–31)
CO2 SERPL-SCNC: 25 MMOL/L — SIGNIFICANT CHANGE UP (ref 22–31)
CREAT SERPL-MCNC: 0.82 MG/DL — SIGNIFICANT CHANGE UP (ref 0.5–1.3)
CREAT SERPL-MCNC: 0.82 MG/DL — SIGNIFICANT CHANGE UP (ref 0.5–1.3)
D DIMER BLD IA.RAPID-MCNC: <150 NG/ML DDU — SIGNIFICANT CHANGE UP
D DIMER BLD IA.RAPID-MCNC: <150 NG/ML DDU — SIGNIFICANT CHANGE UP
EGFR: 98 ML/MIN/1.73M2 — SIGNIFICANT CHANGE UP
EGFR: 98 ML/MIN/1.73M2 — SIGNIFICANT CHANGE UP
EOSINOPHIL # BLD AUTO: 0.11 K/UL — SIGNIFICANT CHANGE UP (ref 0–0.5)
EOSINOPHIL # BLD AUTO: 0.11 K/UL — SIGNIFICANT CHANGE UP (ref 0–0.5)
EOSINOPHIL NFR BLD AUTO: 1.7 % — SIGNIFICANT CHANGE UP (ref 0–6)
EOSINOPHIL NFR BLD AUTO: 1.7 % — SIGNIFICANT CHANGE UP (ref 0–6)
GIANT PLATELETS BLD QL SMEAR: PRESENT — SIGNIFICANT CHANGE UP
GIANT PLATELETS BLD QL SMEAR: PRESENT — SIGNIFICANT CHANGE UP
GLUCOSE SERPL-MCNC: 85 MG/DL — SIGNIFICANT CHANGE UP (ref 70–99)
GLUCOSE SERPL-MCNC: 85 MG/DL — SIGNIFICANT CHANGE UP (ref 70–99)
HCG SERPL-ACNC: <1 MIU/ML — SIGNIFICANT CHANGE UP
HCG SERPL-ACNC: <1 MIU/ML — SIGNIFICANT CHANGE UP
HCT VFR BLD CALC: 45.5 % — HIGH (ref 34.5–45)
HCT VFR BLD CALC: 45.5 % — HIGH (ref 34.5–45)
HGB BLD-MCNC: 15.1 G/DL — SIGNIFICANT CHANGE UP (ref 11.5–15.5)
HGB BLD-MCNC: 15.1 G/DL — SIGNIFICANT CHANGE UP (ref 11.5–15.5)
IANC: 3.28 K/UL — SIGNIFICANT CHANGE UP (ref 1.8–7.4)
IANC: 3.28 K/UL — SIGNIFICANT CHANGE UP (ref 1.8–7.4)
INR BLD: 1.11 RATIO — SIGNIFICANT CHANGE UP (ref 0.85–1.18)
INR BLD: 1.11 RATIO — SIGNIFICANT CHANGE UP (ref 0.85–1.18)
LYMPHOCYTES # BLD AUTO: 2.34 K/UL — SIGNIFICANT CHANGE UP (ref 1–3.3)
LYMPHOCYTES # BLD AUTO: 2.34 K/UL — SIGNIFICANT CHANGE UP (ref 1–3.3)
LYMPHOCYTES # BLD AUTO: 36.5 % — SIGNIFICANT CHANGE UP (ref 13–44)
LYMPHOCYTES # BLD AUTO: 36.5 % — SIGNIFICANT CHANGE UP (ref 13–44)
MCHC RBC-ENTMCNC: 28.2 PG — SIGNIFICANT CHANGE UP (ref 27–34)
MCHC RBC-ENTMCNC: 28.2 PG — SIGNIFICANT CHANGE UP (ref 27–34)
MCHC RBC-ENTMCNC: 33.2 GM/DL — SIGNIFICANT CHANGE UP (ref 32–36)
MCHC RBC-ENTMCNC: 33.2 GM/DL — SIGNIFICANT CHANGE UP (ref 32–36)
MCV RBC AUTO: 85 FL — SIGNIFICANT CHANGE UP (ref 80–100)
MCV RBC AUTO: 85 FL — SIGNIFICANT CHANGE UP (ref 80–100)
MONOCYTES # BLD AUTO: 0.62 K/UL — SIGNIFICANT CHANGE UP (ref 0–0.9)
MONOCYTES # BLD AUTO: 0.62 K/UL — SIGNIFICANT CHANGE UP (ref 0–0.9)
MONOCYTES NFR BLD AUTO: 9.6 % — SIGNIFICANT CHANGE UP (ref 2–14)
MONOCYTES NFR BLD AUTO: 9.6 % — SIGNIFICANT CHANGE UP (ref 2–14)
NEUTROPHILS # BLD AUTO: 2.79 K/UL — SIGNIFICANT CHANGE UP (ref 1.8–7.4)
NEUTROPHILS # BLD AUTO: 2.79 K/UL — SIGNIFICANT CHANGE UP (ref 1.8–7.4)
NEUTROPHILS NFR BLD AUTO: 43.5 % — SIGNIFICANT CHANGE UP (ref 43–77)
NEUTROPHILS NFR BLD AUTO: 43.5 % — SIGNIFICANT CHANGE UP (ref 43–77)
NT-PROBNP SERPL-SCNC: <36 PG/ML — SIGNIFICANT CHANGE UP
NT-PROBNP SERPL-SCNC: <36 PG/ML — SIGNIFICANT CHANGE UP
PLAT MORPH BLD: NORMAL — SIGNIFICANT CHANGE UP
PLAT MORPH BLD: NORMAL — SIGNIFICANT CHANGE UP
PLATELET # BLD AUTO: 335 K/UL — SIGNIFICANT CHANGE UP (ref 150–400)
PLATELET # BLD AUTO: 335 K/UL — SIGNIFICANT CHANGE UP (ref 150–400)
PLATELET COUNT - ESTIMATE: NORMAL — SIGNIFICANT CHANGE UP
PLATELET COUNT - ESTIMATE: NORMAL — SIGNIFICANT CHANGE UP
POTASSIUM SERPL-MCNC: 3 MMOL/L — LOW (ref 3.5–5.3)
POTASSIUM SERPL-MCNC: 3 MMOL/L — LOW (ref 3.5–5.3)
POTASSIUM SERPL-SCNC: 3 MMOL/L — LOW (ref 3.5–5.3)
POTASSIUM SERPL-SCNC: 3 MMOL/L — LOW (ref 3.5–5.3)
PROT SERPL-MCNC: 8.6 G/DL — HIGH (ref 6–8.3)
PROT SERPL-MCNC: 8.6 G/DL — HIGH (ref 6–8.3)
PROTHROM AB SERPL-ACNC: 12.5 SEC — SIGNIFICANT CHANGE UP (ref 9.5–13)
PROTHROM AB SERPL-ACNC: 12.5 SEC — SIGNIFICANT CHANGE UP (ref 9.5–13)
RBC # BLD: 5.35 M/UL — HIGH (ref 3.8–5.2)
RBC # BLD: 5.35 M/UL — HIGH (ref 3.8–5.2)
RBC # FLD: 14.2 % — SIGNIFICANT CHANGE UP (ref 10.3–14.5)
RBC # FLD: 14.2 % — SIGNIFICANT CHANGE UP (ref 10.3–14.5)
RBC BLD AUTO: NORMAL — SIGNIFICANT CHANGE UP
RBC BLD AUTO: NORMAL — SIGNIFICANT CHANGE UP
SODIUM SERPL-SCNC: 136 MMOL/L — SIGNIFICANT CHANGE UP (ref 135–145)
SODIUM SERPL-SCNC: 136 MMOL/L — SIGNIFICANT CHANGE UP (ref 135–145)
TROPONIN T, HIGH SENSITIVITY RESULT: <6 NG/L — SIGNIFICANT CHANGE UP
TROPONIN T, HIGH SENSITIVITY RESULT: <6 NG/L — SIGNIFICANT CHANGE UP
VARIANT LYMPHS # BLD: 7 % — HIGH (ref 0–6)
VARIANT LYMPHS # BLD: 7 % — HIGH (ref 0–6)
WBC # BLD: 6.41 K/UL — SIGNIFICANT CHANGE UP (ref 3.8–10.5)
WBC # BLD: 6.41 K/UL — SIGNIFICANT CHANGE UP (ref 3.8–10.5)
WBC # FLD AUTO: 6.41 K/UL — SIGNIFICANT CHANGE UP (ref 3.8–10.5)
WBC # FLD AUTO: 6.41 K/UL — SIGNIFICANT CHANGE UP (ref 3.8–10.5)

## 2023-12-03 PROCEDURE — 71046 X-RAY EXAM CHEST 2 VIEWS: CPT | Mod: 26

## 2023-12-03 PROCEDURE — 93010 ELECTROCARDIOGRAM REPORT: CPT

## 2023-12-03 PROCEDURE — 99284 EMERGENCY DEPT VISIT MOD MDM: CPT

## 2023-12-03 RX ORDER — ACETAMINOPHEN 500 MG
1000 TABLET ORAL ONCE
Refills: 0 | Status: COMPLETED | OUTPATIENT
Start: 2023-12-03 | End: 2023-12-03

## 2023-12-03 RX ORDER — POTASSIUM CHLORIDE 20 MEQ
40 PACKET (EA) ORAL ONCE
Refills: 0 | Status: COMPLETED | OUTPATIENT
Start: 2023-12-03 | End: 2023-12-03

## 2023-12-03 RX ORDER — SODIUM CHLORIDE 9 MG/ML
1000 INJECTION INTRAMUSCULAR; INTRAVENOUS; SUBCUTANEOUS ONCE
Refills: 0 | Status: COMPLETED | OUTPATIENT
Start: 2023-12-03 | End: 2023-12-03

## 2023-12-03 RX ORDER — METOCLOPRAMIDE HCL 10 MG
10 TABLET ORAL ONCE
Refills: 0 | Status: COMPLETED | OUTPATIENT
Start: 2023-12-03 | End: 2023-12-03

## 2023-12-03 RX ADMIN — Medication 10 MILLIGRAM(S): at 14:56

## 2023-12-03 RX ADMIN — SODIUM CHLORIDE 1000 MILLILITER(S): 9 INJECTION INTRAMUSCULAR; INTRAVENOUS; SUBCUTANEOUS at 14:56

## 2023-12-03 RX ADMIN — Medication 1000 MILLIGRAM(S): at 15:44

## 2023-12-03 RX ADMIN — Medication 400 MILLIGRAM(S): at 14:56

## 2023-12-03 RX ADMIN — Medication 40 MILLIEQUIVALENT(S): at 17:42

## 2023-12-03 RX ADMIN — Medication 1000 MILLIGRAM(S): at 15:20

## 2023-12-03 NOTE — ED ADULT TRIAGE NOTE - CHIEF COMPLAINT QUOTE
Pt. c/o migraines x 2 days unresolved with her medications. States today while at [employer] she started having cramping and wu of bilateral hands and feet. Last time she had similar symptoms and was admitted to ICU for paralyzing migraines. Also c/o dizziness and aura to left eye. Pt. c/o migraines x 2 days unresolved with her medications. States today while at [employer] she started having cramping and wu of bilateral hands and feet. Last time she had similar symptoms and was admitted to ICU for paralyzing migraines. Also c/o dizziness and aura to left eye. Pt. c/o migraines x 2 days unresolved with her medications. States today while at [employer] she started having cramping and wu of bilateral hands and feet. Last time she had similar symptoms and was admitted to ICU for paralyzing migraines. Also c/o dizziness and aura to left eye.

## 2023-12-03 NOTE — ED ADULT NURSE NOTE - NSFALLUNIVINTERV_ED_ALL_ED
Bed/Stretcher in lowest position, wheels locked, appropriate side rails in place/Call bell, personal items and telephone in reach/Instruct patient to call for assistance before getting out of bed/chair/stretcher/Non-slip footwear applied when patient is off stretcher/[place] to call system/Physically safe environment - no spills, clutter or unnecessary equipment/Purposeful proactive rounding/Room/bathroom lighting operational, light cord in reach Bed/Stretcher in lowest position, wheels locked, appropriate side rails in place/Call bell, personal items and telephone in reach/Instruct patient to call for assistance before getting out of bed/chair/stretcher/Non-slip footwear applied when patient is off stretcher/[place] to call system/Physically safe environment - no spills, clutter or unnecessary equipment/Purposeful proactive rounding/Room/bathroom lighting operational, light cord in reach

## 2023-12-03 NOTE — ED PROVIDER NOTE - NSFOLLOWUPINSTRUCTIONS_ED_ALL_ED_FT
You came to the emergency department for headache.  You also had a sense of palpitations.  We checked your thyroid function which was normal.  Your potassium was a little low and you are receiving some.  You may want to eat some extra oranges or bananas over the next few days. Your EKG was normal sinus rhythm rate of 87 QTc 442 no signs of any sort of ischemic changes no sign of hypokalemia on the EKGNo short [place] Or delta wave.  We do not find any acute reason for you to have palpitations but you should discuss this with your physician with the results I have attached to your discharge    You follow with your PCP.  Bring a record of all the results you had done here to your next appointment.  You also need to find a new neurologist.    neurology: 2 options I have listed below but I am also giving you a sheet with other choices.    [facility] - Dr. Laura Gray Schoenberg   address: [address] [place] [address]  Phone: [phone]    Jules Driver MD  Neurologist in [place], [place]  Address: [address] [place] [address] [address], [place] [address]  Phone: [phone]    You are being discharged from the Emergency Department after evaluation of your presenting problem.  You were found not to have an emergency that requires hospitalization or surgery, but this does not mean you do not have a health concern.  You should follow up with your primary care physician and any other physicians suggested at time of discharge.  Also, if your condition worsens or changes know that the emergency department is open and available 24 hours a day/ 7 days a week and you should return to us if you have concerns. Thank you for allowing us to participate in your care. You came to the emergency department for headache.  You also had a sense of palpitations.  We checked your thyroid function which was normal.  Your potassium was a little low and you are receiving some.  You may want to eat some extra oranges or bananas over the next few days. Your EKG was normal sinus rhythm rate of 87 QTc 442 no signs of any sort of ischemic changes no sign of hypokalemia on the EKGNo short [place] Or delta wave.  We do not find any acute reason for you to have palpitations but you should discuss this with your physician with the results I have attached to your discharge    You follow with your PCP.  Bring a record of all the results you had done here to your next appointment.  You also need to find a new neurologist.    neurology: 2 options I have listed below but I am also giving you a sheet with other choices.    [facility] - Dr. Laura Gray Schoenberg   address: [address] [place] [address]  Phone: [phone]    Jules Driver MD  Neurologist in [place], [place]  Address: [address] [place] [address] [address], [place] [address]  Phone: [phone]    You are being discharged from the Emergency Department after evaluation of your presenting problem.  You were found not to have an emergency that requires hospitalization or surgery, but this does not mean you do not have a health concern.  You should follow up with your primary care physician and any other physicians suggested at time of discharge.  Also, if your condition worsens or changes know that the emergency department is open and available 24 hours a day/ 7 days a week and you should return to us if you have concerns. Thank you for allowing us to participate in your care.

## 2023-12-03 NOTE — ED PROVIDER NOTE - PATIENT PORTAL LINK FT
You can access the FollowMyHealth Patient Portal offered by [facility] by registering at the following website: [email or web address]. By joining [facility]’s FollowMyHealth portal, you will also be able to view your health information using other applications (apps) compatible with our system. You can access the FollowMyHealth Patient Portal offered by [facility] by registering at the following website: [email or web address]. By joining [facility]’s FollowMyHealth portal, you will also be able to view your health information using other applications (apps) compatible with our system.

## 2023-12-03 NOTE — ED ADULT TRIAGE NOTE - AS TEMP SITE
Arrived to room 5  for triage. Tolerated without difficulty. Bed in lowest position. Call light given. Gowned for exam. Patient c/o of nausea for the last 10 days. Patient reports no episodes of emesis during this time and sporadic fevers noted. Patient reports a positive home pregnancy test 3 weeks ago. Patient reports this would be first pregnancy. oral

## 2023-12-03 NOTE — ED ADULT NURSE NOTE - CHIEF COMPLAINT QUOTE
Pt. c/o migraines x 2 days unresolved with her medications. States today while at [employer] she started having cramping and wu of bilateral hands and feet. Last time she had similar symptoms and was admitted to ICU for paralyzing migraines. Also c/o dizziness and aura to left eye. Pt. c/o migraines x 2 days unresolved with her medications. States today while at [employer] she started having cramping and wu of bilateral hands and feet. Last time she had similar symptoms and was admitted to ICU for paralyzing migraines. Also c/o dizziness and aura to left eye.

## 2023-12-03 NOTE — ED ADULT NURSE NOTE - OBJECTIVE STATEMENT
pt is a 33 yr old female came to ED c/o 7/10 severe headache with tingling in hands and feet while in [employer] today. pt took migraine medication with no change,. pt ambulatory with steady gait, denies dizziness, lightheadedness. + light sensitivity, nausea. #20 g piv placed in right ac, labs sent, vss. see emar for med treatment. family at bedside. imaging pending. pt is a 33 yr old female came to ED c/o 7/10 severe headache with tingling in hands and feet while in [employer] today. pt took migraine medication with no change,. pt ambulatory with steady gait, denies dizziness, lightheadedness. + light sensitivity, nausea. #20 g piv placed in right ac, labs sent, vss. see emar for med treatment. family at bedside. imaging pending.

## 2023-12-03 NOTE — ED PROVIDER NOTE - EKG ADDITIONAL INFORMATION FREE TEXT
our EKG was normal sinus rhythm rate of 87 QTc 442 no signs of any sort of ischemic changes no sign of hypokalemia on the EKGNo short [place] Or delta wave our EKG was normal sinus rhythm rate of 87 QTc 442 no signs of any sort of ischemic changes no sign of hypokalemia on the EKGNo short [place] Or delta wave

## 2023-12-03 NOTE — ED PROVIDER NOTE - PROGRESS NOTE DETAILS
Kamilla Keith MD attending physician received in signout.  Plan was to reevaluate for headache improvement.  History of migraines.  Received Reglan and Tylenol.  Now much improved.  Has a physician to follow-up with.  Does not currently have a neurologist we will give her a list from neurology

## 2023-12-03 NOTE — ED PROVIDER NOTE - ATTENDING CONTRIBUTION TO CARE
I performed a history and physical exam of the patient and discussed their management with the resident/fellow/ACP/student. I have reviewed the resident/fellow/ACP/student note and agree with the documented findings and plan of care, except as noted. I have personally performed a substantive portion of the visit including all aspects of the medical decision making. My medical decision making and observations are found above. Please refer to any progress notes for updates on clinical course.    31 y/o female with pmhx of chronic migraines on topamax/zonisamide (has received TPA in past with negative MRI; symptoms attributed to hemiplegic migraine) presenting for headache and palpitations.  Patient reports for past few days she has been feeling her typical symptoms of migraine described as bilateral headache, now moved to left side with no associated LOC, change in vision/hearing, focal weakness, numbness/tingling.  Reports headache is improved since symptom onset but does report intermittent nausea/nonbloody emesis.  Was in [employer] today and reports feeling sudden onset of palpitations/chest tightness/shortness of breath and bilateral upper extremity contracture with no associated LOC. Denies any leg swelling/pain, hemoptysis, OCP use, recent travel/bedbound nature, history of blood clots, fever/chills, diarrhea, cough, rashes, dysuria, or hematuria.  LMP 2 weeks ago, described as mildly heavier than usual with no vaginal bleeding in ER. No recent falls/trauma.     Gen: WDWN, NAD, comfortable appearing, tachycardic to 120s, hemodynamically stable   HEENT: Atraumatic head, PERRLA, EOMI, no nasal discharge, mucous membranes moist  CV: Tachycardic with RR, +S1/S2, no M/R/G, equal b/l radial pulses 2+  Resp: CTAB, no W/R/R, SPO2 >95% on RA, no increased WOB   GI: Abdomen soft non-distended, NTTP, no masses/organomegaly   MSK/Skin: No CVA tenderness, no open wounds, no bruising, no LE edema/calf tenderness   Neuro: CN2-12 grossly intact, A&Ox4, MS +5/5 in UE and LE BL, finger to nose smooth and rapid, gross sensation intact in UE and LE BL, negative pronator drift, no contracture   Psych: appropriate mood    MDM:  31 y/o female with pmhx of chronic migraines on topamax/zonisamide (has received TPA in past with negative MRI; symptoms attributed to hemiplegic migraine) presenting for headache and palpitations, reports improvement in symptoms, no FND on exam, hemodynamically stable, tachycardic to 120s with regular rhythm, no PE risk factors. Exam/history concerning for but not limited to complex migraine versus anemia versus metabolic derangement versus low suspicion for ACS versus PE.  Will obtain basic labs, cardiac enzymes, D-dimer given low risk Wells criteria, chest x-ray, symptom control with reglgan/tylenol/fluid bolus and reassess I performed a history and physical exam of the patient and discussed their management with the resident/fellow/ACP/student. I have reviewed the resident/fellow/ACP/student note and agree with the documented findings and plan of care, except as noted. I have personally performed a substantive portion of the visit including all aspects of the medical decision making. My medical decision making and observations are found above. Please refer to any progress notes for updates on clinical course.    33 y/o female with pmhx of chronic migraines on topamax/zonisamide (has received TPA in past with negative MRI; symptoms attributed to hemiplegic migraine) presenting for headache and palpitations.  Patient reports for past few days she has been feeling her typical symptoms of migraine described as bilateral headache, now moved to left side with no associated LOC, change in vision/hearing, focal weakness, numbness/tingling.  Reports headache is improved since symptom onset but does report intermittent nausea/nonbloody emesis.  Was in [employer] today and reports feeling sudden onset of palpitations/chest tightness/shortness of breath and bilateral upper extremity contracture with no associated LOC. Denies any leg swelling/pain, hemoptysis, OCP use, recent travel/bedbound nature, history of blood clots, fever/chills, diarrhea, cough, rashes, dysuria, or hematuria.  LMP 2 weeks ago, described as mildly heavier than usual with no vaginal bleeding in ER. No recent falls/trauma.     Gen: WDWN, NAD, comfortable appearing, tachycardic to 120s, hemodynamically stable   HEENT: Atraumatic head, PERRLA, EOMI, no nasal discharge, mucous membranes moist  CV: Tachycardic with RR, +S1/S2, no M/R/G, equal b/l radial pulses 2+  Resp: CTAB, no W/R/R, SPO2 >95% on RA, no increased WOB   GI: Abdomen soft non-distended, NTTP, no masses/organomegaly   MSK/Skin: No CVA tenderness, no open wounds, no bruising, no LE edema/calf tenderness   Neuro: CN2-12 grossly intact, A&Ox4, MS +5/5 in UE and LE BL, finger to nose smooth and rapid, gross sensation intact in UE and LE BL, negative pronator drift, no contracture   Psych: appropriate mood    MDM:  33 y/o female with pmhx of chronic migraines on topamax/zonisamide (has received TPA in past with negative MRI; symptoms attributed to hemiplegic migraine) presenting for headache and palpitations, reports improvement in symptoms, no FND on exam, hemodynamically stable, tachycardic to 120s with regular rhythm, no PE risk factors. Exam/history concerning for but not limited to complex migraine versus anemia versus metabolic derangement versus low suspicion for ACS versus PE.  Will obtain basic labs, cardiac enzymes, D-dimer given low risk Wells criteria, chest x-ray, symptom control with reglgan/tylenol/fluid bolus and reassess

## 2024-01-01 ENCOUNTER — NON-APPOINTMENT (OUTPATIENT)
Age: 34
End: 2024-01-01

## 2024-02-02 ENCOUNTER — NON-APPOINTMENT (OUTPATIENT)
Age: 34
End: 2024-02-02

## 2024-02-03 ENCOUNTER — EMERGENCY (EMERGENCY)
Facility: HOSPITAL | Age: 34
LOS: 1 days | Discharge: HOME | End: 2024-02-03
Attending: EMERGENCY MEDICINE | Admitting: EMERGENCY MEDICINE
Payer: COMMERCIAL

## 2024-02-03 VITALS
SYSTOLIC BLOOD PRESSURE: 125 MMHG | RESPIRATION RATE: 18 BRPM | HEART RATE: 90 BPM | TEMPERATURE: 99 F | OXYGEN SATURATION: 100 % | DIASTOLIC BLOOD PRESSURE: 85 MMHG

## 2024-02-03 VITALS
OXYGEN SATURATION: 100 % | RESPIRATION RATE: 20 BRPM | TEMPERATURE: 100 F | HEART RATE: 102 BPM | SYSTOLIC BLOOD PRESSURE: 142 MMHG | DIASTOLIC BLOOD PRESSURE: 96 MMHG

## 2024-02-03 LAB
ALBUMIN SERPL ELPH-MCNC: 3.5 G/DL — SIGNIFICANT CHANGE UP (ref 3.3–5)
ALP SERPL-CCNC: 44 U/L — SIGNIFICANT CHANGE UP (ref 40–120)
ALT FLD-CCNC: 11 U/L — SIGNIFICANT CHANGE UP (ref 4–33)
ANION GAP SERPL CALC-SCNC: 10 MMOL/L — SIGNIFICANT CHANGE UP (ref 7–14)
AST SERPL-CCNC: 15 U/L — SIGNIFICANT CHANGE UP (ref 4–32)
BASE EXCESS BLDV CALC-SCNC: 1.4 MMOL/L — SIGNIFICANT CHANGE UP (ref -2–3)
BASOPHILS # BLD AUTO: 0.05 K/UL — SIGNIFICANT CHANGE UP (ref 0–0.2)
BASOPHILS NFR BLD AUTO: 0.5 % — SIGNIFICANT CHANGE UP (ref 0–2)
BILIRUB SERPL-MCNC: <0.2 MG/DL — SIGNIFICANT CHANGE UP (ref 0.2–1.2)
BLOOD GAS VENOUS COMPREHENSIVE RESULT: SIGNIFICANT CHANGE UP
BUN SERPL-MCNC: 9 MG/DL — SIGNIFICANT CHANGE UP (ref 7–23)
CALCIUM SERPL-MCNC: 9.1 MG/DL — SIGNIFICANT CHANGE UP (ref 8.4–10.5)
CHLORIDE BLDV-SCNC: 104 MMOL/L — SIGNIFICANT CHANGE UP (ref 96–108)
CHLORIDE SERPL-SCNC: 103 MMOL/L — SIGNIFICANT CHANGE UP (ref 98–107)
CO2 BLDV-SCNC: 28.6 MMOL/L — HIGH (ref 22–26)
CO2 SERPL-SCNC: 24 MMOL/L — SIGNIFICANT CHANGE UP (ref 22–31)
CREAT SERPL-MCNC: 0.62 MG/DL — SIGNIFICANT CHANGE UP (ref 0.5–1.3)
EGFR: 122 ML/MIN/1.73M2 — SIGNIFICANT CHANGE UP
EOSINOPHIL # BLD AUTO: 0.4 K/UL — SIGNIFICANT CHANGE UP (ref 0–0.5)
EOSINOPHIL NFR BLD AUTO: 4 % — SIGNIFICANT CHANGE UP (ref 0–6)
GAS PNL BLDV: 133 MMOL/L — LOW (ref 136–145)
GLUCOSE BLDV-MCNC: 82 MG/DL — SIGNIFICANT CHANGE UP (ref 70–99)
GLUCOSE SERPL-MCNC: 80 MG/DL — SIGNIFICANT CHANGE UP (ref 70–99)
HCO3 BLDV-SCNC: 27 MMOL/L — SIGNIFICANT CHANGE UP (ref 22–29)
HCT VFR BLD CALC: 39.8 % — SIGNIFICANT CHANGE UP (ref 34.5–45)
HCT VFR BLDA CALC: 37 % — SIGNIFICANT CHANGE UP (ref 34.5–46.5)
HGB BLD CALC-MCNC: 12.3 G/DL — SIGNIFICANT CHANGE UP (ref 11.7–16.1)
HGB BLD-MCNC: 12.8 G/DL — SIGNIFICANT CHANGE UP (ref 11.5–15.5)
IANC: 5.78 K/UL — SIGNIFICANT CHANGE UP (ref 1.8–7.4)
IMM GRANULOCYTES NFR BLD AUTO: 0.5 % — SIGNIFICANT CHANGE UP (ref 0–0.9)
LACTATE BLDV-MCNC: 0.8 MMOL/L — SIGNIFICANT CHANGE UP (ref 0.5–2)
LYMPHOCYTES # BLD AUTO: 2.94 K/UL — SIGNIFICANT CHANGE UP (ref 1–3.3)
LYMPHOCYTES # BLD AUTO: 29.3 % — SIGNIFICANT CHANGE UP (ref 13–44)
MCHC RBC-ENTMCNC: 28.2 PG — SIGNIFICANT CHANGE UP (ref 27–34)
MCHC RBC-ENTMCNC: 32.2 GM/DL — SIGNIFICANT CHANGE UP (ref 32–36)
MCV RBC AUTO: 87.7 FL — SIGNIFICANT CHANGE UP (ref 80–100)
MONOCYTES # BLD AUTO: 0.81 K/UL — SIGNIFICANT CHANGE UP (ref 0–0.9)
MONOCYTES NFR BLD AUTO: 8.1 % — SIGNIFICANT CHANGE UP (ref 2–14)
NEUTROPHILS # BLD AUTO: 5.78 K/UL — SIGNIFICANT CHANGE UP (ref 1.8–7.4)
NEUTROPHILS NFR BLD AUTO: 57.6 % — SIGNIFICANT CHANGE UP (ref 43–77)
NRBC # BLD: 0 /100 WBCS — SIGNIFICANT CHANGE UP (ref 0–0)
NRBC # FLD: 0 K/UL — SIGNIFICANT CHANGE UP (ref 0–0)
PCO2 BLDV: 47 MMHG — SIGNIFICANT CHANGE UP (ref 39–52)
PH BLDV: 7.37 — SIGNIFICANT CHANGE UP (ref 7.32–7.43)
PLATELET # BLD AUTO: 309 K/UL — SIGNIFICANT CHANGE UP (ref 150–400)
PO2 BLDV: 31 MMHG — SIGNIFICANT CHANGE UP (ref 25–45)
POTASSIUM BLDV-SCNC: 4.3 MMOL/L — SIGNIFICANT CHANGE UP (ref 3.5–5.1)
POTASSIUM SERPL-MCNC: 4.3 MMOL/L — SIGNIFICANT CHANGE UP (ref 3.5–5.3)
POTASSIUM SERPL-SCNC: 4.3 MMOL/L — SIGNIFICANT CHANGE UP (ref 3.5–5.3)
PROT SERPL-MCNC: 7.5 G/DL — SIGNIFICANT CHANGE UP (ref 6–8.3)
RBC # BLD: 4.54 M/UL — SIGNIFICANT CHANGE UP (ref 3.8–5.2)
RBC # FLD: 14.6 % — HIGH (ref 10.3–14.5)
SAO2 % BLDV: 45.9 % — LOW (ref 67–88)
SODIUM SERPL-SCNC: 137 MMOL/L — SIGNIFICANT CHANGE UP (ref 135–145)
WBC # BLD: 10.03 K/UL — SIGNIFICANT CHANGE UP (ref 3.8–10.5)
WBC # FLD AUTO: 10.03 K/UL — SIGNIFICANT CHANGE UP (ref 3.8–10.5)

## 2024-02-03 PROCEDURE — 93010 ELECTROCARDIOGRAM REPORT: CPT

## 2024-02-03 PROCEDURE — 99284 EMERGENCY DEPT VISIT MOD MDM: CPT

## 2024-02-03 RX ORDER — AZITHROMYCIN 500 MG/1
500 TABLET, FILM COATED ORAL ONCE
Refills: 0 | Status: COMPLETED | OUTPATIENT
Start: 2024-02-03 | End: 2024-02-03

## 2024-02-03 RX ORDER — ACETAMINOPHEN 500 MG
1000 TABLET ORAL ONCE
Refills: 0 | Status: COMPLETED | OUTPATIENT
Start: 2024-02-03 | End: 2024-02-03

## 2024-02-03 RX ORDER — AZITHROMYCIN 500 MG/1
1 TABLET, FILM COATED ORAL
Qty: 4 | Refills: 0
Start: 2024-02-03 | End: 2024-02-06

## 2024-02-03 RX ORDER — SODIUM CHLORIDE 9 MG/ML
1000 INJECTION INTRAMUSCULAR; INTRAVENOUS; SUBCUTANEOUS ONCE
Refills: 0 | Status: COMPLETED | OUTPATIENT
Start: 2024-02-03 | End: 2024-02-03

## 2024-02-03 RX ORDER — CEFTRIAXONE 500 MG/1
1000 INJECTION, POWDER, FOR SOLUTION INTRAMUSCULAR; INTRAVENOUS ONCE
Refills: 0 | Status: COMPLETED | OUTPATIENT
Start: 2024-02-03 | End: 2024-02-03

## 2024-02-03 RX ADMIN — SODIUM CHLORIDE 1000 MILLILITER(S): 9 INJECTION INTRAMUSCULAR; INTRAVENOUS; SUBCUTANEOUS at 15:56

## 2024-02-03 RX ADMIN — CEFTRIAXONE 100 MILLIGRAM(S): 500 INJECTION, POWDER, FOR SOLUTION INTRAMUSCULAR; INTRAVENOUS at 16:32

## 2024-02-03 RX ADMIN — Medication 100 MILLIGRAM(S): at 16:32

## 2024-02-03 RX ADMIN — AZITHROMYCIN 255 MILLIGRAM(S): 500 TABLET, FILM COATED ORAL at 17:50

## 2024-02-03 RX ADMIN — Medication 400 MILLIGRAM(S): at 15:56

## 2024-02-03 NOTE — ED ADULT NURSE NOTE - OBJECTIVE STATEMENT
Pt a&ox3, no past medical history, tachypneic, + short of breath, breathing even and labored, spo2 100% room air. Pt endorses mild chest discomfort upon coughing. Pt evaluated at urgent care and sent to ED for eval of possible pneumonia, afebrile at present, labs collected and sent, ivl placed, medicated as ordered, will continue to monitor.

## 2024-02-03 NOTE — ED PROVIDER NOTE - PRINCIPAL DIAGNOSIS
Patient: MURIEL CASTRO     Acct: [id number]     Report: #[id number]  UNIT #: [id number]     : 1957    Encounter Date:2019  PRIMARY CARE: CATHY WELLINGTON  ***Signed***  --------------------------------------------------------------------------------------------------------------------  Chief Complaint      Encounter Date      2019            Primary Care Provider      RADHA LOPEZ            Referring Provider      RADHA LOPEZ            Patient Complaint      Patient is complaining of      f/u bipap compliance            VITALS      Height 5 ft 5 in / 165.1 cm      Weight 168 lbs 5 oz / 76.923531 kg      BSA 1.84 m2      BMI 28.0 kg/m2      Temperature 97.8 F / 36.56 C - Oral      Pulse 72      Respirations 14      Blood Pressure 111/60 Sitting, Right Arm      Pulse Oximetry 95%, roomair      Initial Exhaled Nitrous Oxide      Date:  2019      Exhaled Nitrous Oxide Results:  13            HPI      The patient is a 61 year old  female former cigarettes smoker with     Chronic Obstructive Pulmonary Disease and chronic hypoxic respiratory failure on    2 liters of oxygen here for follow up.             Since her last visit I got records from her pulmonologist Dr. Holly in [place].    She did have severe Chronic Obstructive Pulmonary Disease and chronic hypoxemic     respiratory failure. She is doing well on her current therapies and Daliresp.     She is asking about switching from breo and Incruse to trelegy which is     reasonable. She uses BiPAP  and her compliance report over the last month     shows she uses it every night for 6 hours with an average AHI of 3. She gets     short of breath walking about 800-900 feet or going up 2 flights of stairs. It     is moderate in severity, worse with exertion, relieved with rest. She denies any    coughing, wheezing, chest pain or hemoptysis. She denies any nausea, vomiting,     fevers, chills, headaches or chest pain. She  denies any morning headaches or     excessive daytime sleepiness. She is able to perform her ADL's without     difficulty and denies any swollen glands in her lymph nodes in the head or neck.            I personally reviewed Review of Systems, family, social, surgical and medical     history and agree with their findings.            ROS      Constitutional:  Denies: Fatigue, Fever, Weight gain, Weight loss, Chills,     Insomnia, Other      Respiratory/Breathing:  Denies: Shortness of air, Wheezing, Cough, Hemoptysis,     Pleuritic pain, Other      Endocrine:  Denies: Polydipsia, Polyuria, Heat/cold intolerance, Abnorml     menstrual pattern, Diabetes, Other      Eyes:  Denies: Blurred vision, Vision Changes, Other      Ears, nose, mouth, throat:  Denies: Mouth lesions, Thrush, Throat pain,     Hoarseness, Allergies/Hay Fever, Post Nasal Drip, Headaches, Recent Head Injury,    Nose Bleeding, Neck Stiffness, Thyroid Mass, Hearing Loss, Ear Fullness, Dry     Mouth, Nasal or Sinus Pain, Dry Lips, Nasal discharge, Nasal congestion, Other      Cardiovascular:  Denies: Palpitations, Syncope, Claudication, Chest Pain, Wake     up Gasping for air, Leg Swelling, Irregular Heart Rate, Cyanosis, Dyspnea on     Exertion, Other      Gastrointestinal:  Denies: Nausea, Constipation, Diarrhea, Abdominal pain,     Vomiting, Difficulty Swallowing, Reflux/Heartburn, Dysphagia, Jaundice,     Bloating, Melena, Bloody stools, Other      Genitourinary:  Denies: Urinary frequency, Incontinence, Hematuria, Urgency,     Nocturia, Dysuria, Testicular problems, Other      Musculoskeletal:  Denies: Joint Pain, Joint Stiffness, Joint Swelling, Myalgias,    Other      Hematologic/lymphatic:  DENIES: Lymphadenopathy, Bruising, Bleeding tendencies,     Other      Neurological:  Denies: Headache, Numbness, Weakness, Seizures, Other      Psychiatric:  Denies: Anxiety, Appropriate Effect, Depression, Other      Sleep:  No: Excessive daytime sleep,  Morning Headache?, Snoring, Insomnia?, Stop    breathing at sleep?, Other      Integumentary:  Denies: Rash, Dry skin, Skin Warm to Touch, Other      Immunization status:  No: Up to date            FAMILY/SOCIAL/MEDICAL HX      Surgical History:  Yes: Appendectomy, Breast Surgery (biopsy), Hernia Surgery      Diabetes - Family Hx:  Brother      Is Father Still Living?:  No      Is Mother Still Living?:  No       Family History:  Yes      Social History:  No Tobacco Use, No Alcohol Use, No Recreational Drug use      Smoking status:  Former smoker (1 ppd x 40 y quit 2011)      Smoking history:  25-50 pack years      Hysterectomy:  Yes      Anticoagulation Therapy:  No      Antibiotic Prophylaxis:  No      Medical History:  Yes: Asthma, Chronic Bronchitis/COPD; No: Sinus Trouble      Psychiatric History      none            PREVENTION      Hx Influenza Vaccination:  Yes      Date Influenza Vaccine Given:  Dec 1, 2018      Influenza Vaccine Declined:  No      2 or More Falls Past Year?:  No      Fall Past Year with Injury?:  No      Hx Pneumococcal Vaccination:  Yes      Encouraged to follow-up with:  PCP regarding preventative exams.      Chart initiated by      timur/ ma            ALLERGIES/MEDICATIONS      Allergies:        Coded Allergies:             SULFAMETHOXAZOLE (Verified  Allergy, Unknown, 4/17/19)           TRIMETHOPRIM (Verified  Allergy, Unknown, 4/17/19)      Uncoded Allergies:             zpack (Allergy, Unknown, 12/19/18)      Medications    Last Reconciled on 4/17/19 12:07 by JJ GOLDEN MD      Oxygen (OXYGEN)  Gas               Reported         1/18/19       BIPAP Compressor (BIPAP) 1 Each Each      EACH XX ONCE, #1 0 Refills         Reported         1/18/19       Fexofenadine Hcl (Fexofenadine Hcl) 180 Mg Tablet      180 MG PO QDAY, #30 TAB 0 Refills         Reported         1/18/19       PSEUDOEPHEDRINE HCl (Sudogest) 60 Mg Tablet      30 MG PO Q6H PRN for CONGESTION, TAB 0 Refills          Reported         1/18/19       Cholecalciferol (Vitamin D3*) 2,000 Unit Tablet      5000 UNITS PO QDAY, #60 TAB 0 Refills         Reported         1/18/19       Montelukast Sodium (Singulair*) 10 Mg Tablet      10 MG PO QDAY, #30 TAB 0 Refills         Reported         1/18/19       Calcium Carb/Vit D3 (CALCIUM 600-VIT D3 400 TABLET) 1 Each Tablet      1 EACH PO QDAY, #60 TAB 0 Refills         Reported         1/18/19       Furosemide* (Lasix*) 20 Mg Tablet      20 MG PO QDAY, #30 TAB 0 Refills         Reported         1/18/19       Esomeprazole Mag (NexIUM*) 40 Mg Suspdr.pkt      40 MG PO QDAY@07, #30 PACKET 0 Refills         Reported         1/18/19       Shad-Fluticasone (Fluticasone 50 mcg) 16 Gm Spray.susp      2 PUFFS NARE EACH QDAY, #1 BOTTLE 0 Refills         Reported         1/18/19       Alendronate Sodium (Fosamax) 70 Mg Tablet      70 MG PO Fr for 30 Days, #4 TAB         Reported         1/18/19       Roflumilast (Daliresp) 500 Mcg Tab      500 MCG PO QDAY for 30 Days, #30 TAB         Reported         1/18/19       busPIRone HCl (busPIRone HCl) 15 Mg Tablet      7.5 MG PO BID, #30 TAB         Reported         1/18/19       Meloxicam (Meloxicam*) 15 Mg Tablet      15 MG PO QDAY, #30 TAB 0 Refills         Reported         1/18/19       MDI-Albuterol (Ventolin HFA) 18 Gm Hfa.aer.ad      2 PUFFS INH Q6H PRN for SHORTNESS OF BREATH, #1 MDI 0 Refills         Reported         1/18/19       Benzonatate (Tessalon Perles) 100 Mg Cap      100 MG PO TID, #20 CAP         Prov: CARLOS BENITEZ CFR         12/19/18       Azelastine Hcl (Azelastine Nasal) 137 Mcg/0.137 Ml Spray.pump      1 PUFFS NARE EACH BID, #1 BOTTLE         Prov: CARLOS BENITEZ CFR         12/19/18       Dm/Pse/Bpm 10-30-2 Mg/5ML (BROMFED DM COUGH SYRUP) 473 Ml Syrup      10 ML PO Q6H PRN for COUGH, #118 ML         Prov: CARLOS BENITEZ CFR         12/19/18       PSEUDOEPHEDRINE HCl (Sudogest) 60 Mg Tablet      30 MG PO Q6H PRN for CONGESTION, TAB 0  Refills         Reported         12/19/18       Fexofenadine Hcl (Fexofenadine Hcl) 180 Mg Tablet      180 MG PO QDAY, #30 TAB 0 Refills         Reported         12/19/18       Montelukast Sodium (Montelukast*) Unknown Strength Tablet      PO QDAY, TAB         Reported         12/19/18       Furosemide (Furosemide) 20 Mg Tablet      20 MG PO QDAY, #30 TAB 0 Refills         Reported         12/19/18       Esomeprazole Mag (NexIUM) 40 Mg Capsule      40 MG PO QDAY@07, #30 CAP 0 Refills         Reported         12/19/18       Shad-Fluticasone (Fluticasone 50 mcg) 16 Gm Spray.susp      2 PUFFS NARE EACH QDAY, #1 BOTTLE 0 Refills         Reported         12/19/18       Alendronate Sodium (Alendronate) 70 Mg Tablet      70 MG PO Fr, #4 TAB         Reported         12/19/18       Roflumilast (Daliresp) 500 Mcg Tab      500 MCG PO QDAY for 30 Days, #30 TAB         Reported         12/19/18       busPIRone HCl (busPIRone HCl) 15 Mg Tablet      7.5 MG PO BID, #30 TAB         Reported         12/19/18       Meloxicam (Meloxicam*) 15 Mg Tablet      15 MG PO QDAY, #30 TAB 0 Refills         Reported         12/19/18       MDI-Albuterol (Ventolin HFA) 18 Gm Hfa.aer.ad      2 PUFFS INH Q4H PRN for SHORTNESS OF BREATH, #1 INH 0 Refills         Reported         12/19/18       Fluticasone/Vilanterol 100-25 Mcg Inh (Breo Ellipta 100-25 Mcg Inh) Unknown     Strength Blst.w.dev      INH QDAY, #1 MDI 0 Refills         Reported         12/19/18      Current Medications      Current Medications Reviewed 4/17/19            EXAM      Vital Signs Reviewed      Gen: WDWN, Alert, NAD.        HEENT:  PERRL, EOMI.  OP, nares clear, no sinus tenderness.      Chest:  Good aeration, clear to auscultation bilaterally, tympanic to percussion    bilaterally, no work of breathing noted.      CV:  RRR, no MGR, pulses 2+, equal.      Abd:  Soft, NT, ND, + BS, no HSM.      EXT:  No clubbing, no cyanosis, no edema.       Neuro:  A  Skin: No rashes or lesions.       Vtials      Vitals:             Height 5 ft 5 in / 165.1 cm           Weight 168 lbs 5 oz / 76.647611 kg           BSA 1.84 m2           BMI 28.0 kg/m2           Temperature 97.8 F / 36.56 C - Oral           Pulse 72           Respirations 14           Blood Pressure 111/60 Sitting, Right Arm           Pulse Oximetry 95%, roomair            REVIEW      Results Reviewed      PCCS Results Reviewed?:  Yes Prev Lab Results, Yes Prev Radiology Results, Yes     Previous Mec[facility] Records      Lab Results      I personally reviewed her BiPAP compliance report showing nightly use with AHI     of 3 for about 6 hours a night. I reviewed her medical records from her     pulmonologist in [place] showing that she has Chronic Obstructive Pulmonary     Disease, NE wearing a BiPAP at night and chronic hypoxic respiratory failure.      Radiographic Results               [facility]                PACS RADIOLOGY REPORT            Patient: MURIEL CASTRO   Acct: #[id number]   Report: #[id number]            UNIT #: [id number]    DOS: 19 1400      INSURANCE:MEDICARE PART A   LOCATION:[facility]     : 1957            PROVIDERS      ADMITTING:     ATTENDING: JJ GOLDEN      FAMILY:  RADHA LOPEZ   ORDERING:  JJ GOLDEN         OTHER:    DICTATING:  Marc Easley MD            REQ #:[id number]   EXAM:[facility] - LOW DOSE CHEST CT SCREENING      REASON FOR EXAM:        REASON FOR VISIT:  FORMER SMOKER            *******Signed******         PROCEDURE:   CT LOW DOSE CHEST SCREENING             COMPARISON:   None.             REASON FOR SCREENING:   Patient is 61 years of age, asymptomatic, and has a     smoking history of more       than 30 pack years.      SMOKING STATUS:   Former smoker.  Years since quittin                SCREENING VISIT:   Baseline.                   TECHNIQUE:   Axial unenhanced LDCT images from the apices through mid-kidney     were  obtained.       Evaluation of solid organs and vascular structures is suboptimal due to the lack    of IV contrast.       Imaging was performed on a [employer] 128 CT scanner.             RADIATION:   CT Dose Index Vol (CTDIvol):    3.085  mGy         Dose Length Product (DLP):    132.2  mGy-cm             DIAGNOSTIC QUALITY:   Satisfactory.               FINDINGS:         LUNG NODULES:         LUNGS:         COPD:   None.        FIBROSIS:   None.        LYMPH NODES:   None.        OTHER FINDINGS:   Mild bronchiectasis.               RIGHT PLEURAL SPACE:         EFFUSION:   None.        CALCIFICATION:   None.        THICKENING:   None.        PNEUMOTHORAX:   None.               LEFT PLEURAL SPACE:         EFFUSION:   None.        CALCIFICATION:   None.        THICKENING:   None.        PNEUMOTHORAX:   None.               HEART:         SIZE:   Normal.        CORONARY CALC:   None.        PERICARDIAL EFFUSION:   Questionable small tiny pericardial effusion      OTHER FINDINGS:   None.               UPPER ABDOMEN:   None.        THORAX:   None.        BASE OF NECK:   None.               LUNG-RADS CLASSIFICATION:  Lung-RADS 1 - Negative.  No nodules or definitely     benign nodules.        Recommendation: Continue annual screening with LDCT in 12 months.  <1%     probability of malignancy.        Estimated population prevalence is 90%.  Reference: ACR Lung-RADS (Lung CT     Screening reporting and       data system) Version 1.0 assessment categories release date April 28, 2014.               CONCLUSION:         1. No evidence of lung cancer      2. No acute disease in the chest.      3. Mild bronchiectasis              KAYLEEN IRELAND MD             Electronically Signed and Approved By: KAYLEEN IRELAND MD on 3/06/2019 at     14:38                        Until signed, this is an unconfirmed preliminary report that may contain      errors and is subject to change.                                               DELTAALEENA:      D:03/06/19 1438            Assessment      IMPRESSION:      1. Severe Chronic Obstructive Pulmonary Disease well controlled on current     inhaler regimen. FEV1 less than 50% by her pulmonologist in [place] Dr. Holly.     She is on triple inhaler therapy and would like to switch to breo. COPD     assessment test score is 6 today signifying great control of underlying      disease.       2. NE well controlled with BiPAP.       3. Chronic hypoxemic respiratory failure on 2 liters per minute of oxygen.       4. Tobacco abuse of cigarettes in remission.             PLAN:      1. Switch inhalers from breo and Incruse over to trelegy 1 puff daily. Inhaler     education provided today.      2. Continue annual LDCT screening for lung cancer.       3. Continue Singular.       4. Continue Daliresp.       5. Continue 2 liters of oxygen to keep SPO2 greater than 90%.       6. Continue BiPAP nightly and with naps on settings of 16/8.       7. Up to date with  flu, Prevnar and Pneumovax.      8. Follow up with me in 6 months.            Patient Education      Patient Education Provided:  COPD, How to use an Inhaler                 Disclaimer: Converted document may not contain table formatting or lab diagrams. Please see [employer] for the authenticated document.   Pneumonia

## 2024-02-03 NOTE — ED PROVIDER NOTE - ATTENDING APP SHARED VISIT CONTRIBUTION OF CARE
DR. ENGEL, ATTENDING MD-  I performed a face to face bedside interview with patient regarding history of present illness, review of symptoms and past medical history. I completed an independent physical exam.  I have discussed patient's plan of care with the NP.   Documentation as above in the note.    32 y/o female with c/o cough, ruffin, f/c (tmax 102) x2 days.  Seen at [facility] and found to have cxr demonstrating pna.  Pt mild tachycardia here.  Likely pna.  Will obtain cbc cmp blood cx x2 vbg give ivf bolus tylenol abx, reassess, if no desat on amb, likely clear for dc with po abx, otherwise may benefit from continued iv abx.

## 2024-02-03 NOTE — ED PROVIDER NOTE - PATIENT PORTAL LINK FT
You can access the FollowMyHealth Patient Portal offered by [facility] by registering at the following website: [email or web address]. By joining [employer]’s FollowMyHealth portal, you will also be able to view your health information using other applications (apps) compatible with our system.

## 2024-02-03 NOTE — ED PROVIDER NOTE - NSFOLLOWUPINSTRUCTIONS_ED_ALL_ED_FT
Take your medicine exactly as directed. Don’t skip doses. Continue taking your antibiotics as directed until they are all gone—even if you start to feel better. This will prevent the pneumonia from coming back. Coughing up mucus is normal. Don’t use medicines to suppress your cough unless your cough is dry, painful, or interferes with your sleep. Get plenty of rest until your fever, shortness of breath, and chest pain go away. Plan to get a flu shot every year. Ask your primary care doctor about pneumonia vaccines.    Seek immediate medical attention if you experience chest pain, trouble breathing, blue lips or fingernails, fever of 100.4°F  (38°C) or higher, yellow, green, bloody, or smelly sputum, more than normal mucus production, vomiting or diarrhea.    There are no signs of emergency conditions requiring admission to the hospital on today's workup.  Based on the evaluation, a presumptive diagnosis was made, however, further evaluation may be required by your primary care physician or a specialist for a more definitive diagnosis.  Therefore, please follow-up as directed or return to the Emergency Department if your symptoms change or worsen.    We recommend that you:  See your primary care physician within the next 72 hours for follow up.  Bring a copy of your discharge paperwork (including any test results) to your doctor.        *** Return immediately if you have worsening symptoms, chest pain, Shortness of Breath, abdominal pain, Nausea/Vomiting/Diarrhea, dizziness, weakness, confusion, severe headache, vision changes, urinary symptoms, syncope, falls, trauma, discharge, bleeding, fevers, or any other new/concerning symptoms. ***

## 2024-02-03 NOTE — ED PROVIDER NOTE - CLINICAL SUMMARY MEDICAL DECISION MAKING FREE TEXT BOX
This is a 33-year-old female past medical history migraine headache with complaint of sob, coughing, fever, chills for the last two days. She went to urgent care and was sent to ER for further evaluation after her x-ray results. Reports sick contact as her work environment. Denies recent travel, dizziness, abd, urinary symptoms.

## 2024-02-03 NOTE — ED ADULT TRIAGE NOTE - CHIEF COMPLAINT QUOTE
reports cough ,fever x past  2 day with  ch pains upon coughing.  respirations sl  labored upon talking, sent from urgicenter for pneumonia

## 2024-02-03 NOTE — ED PROVIDER NOTE - OBJECTIVE STATEMENT
This is a 33-year-old female past medical history migraine headache with complaint of sob, coughing, fever, chills for the last two days. She went to urgent care and was sent to ER for further evaluation after her x-ray results. Reports sick contact as her work environment. Denies recent travel, dizziness, abd, urinary symptoms.
no distention/nontender/soft

## 2024-02-09 LAB
CULTURE RESULTS: SIGNIFICANT CHANGE UP
SPECIMEN SOURCE: SIGNIFICANT CHANGE UP

## 2024-05-14 ENCOUNTER — APPOINTMENT (OUTPATIENT)
Dept: INTERNAL MEDICINE | Facility: CLINIC | Age: 34
End: 2024-05-14

## 2024-09-23 NOTE — ED ADULT NURSE NOTE - BEFAST ARM NUMBNESS
carbidopa-levodopa (SINEMET)  MG per tablet               Disp-315 tablet, R-1        Spoke with wife.  Patient is still not able to handle taking 1.5 tablets during the daytime due to blood pressure.  Continuing at 1 tablet in morning, 1 tablet at 3pm and 1.5 tablets at bedtime.   Yes

## 2024-11-07 ENCOUNTER — RESULT REVIEW (OUTPATIENT)
Age: 34
End: 2024-11-07

## 2024-11-07 ENCOUNTER — EMERGENCY (EMERGENCY)
Facility: HOSPITAL | Age: 34
LOS: 1 days | Discharge: HOME | End: 2024-11-07
Attending: STUDENT IN AN ORGANIZED HEALTH CARE EDUCATION/TRAINING PROGRAM | Admitting: EMERGENCY MEDICINE
Payer: COMMERCIAL

## 2024-11-07 VITALS
HEART RATE: 108 BPM | WEIGHT: 188.05 LBS | DIASTOLIC BLOOD PRESSURE: 84 MMHG | SYSTOLIC BLOOD PRESSURE: 128 MMHG | RESPIRATION RATE: 16 BRPM | TEMPERATURE: 98 F | OXYGEN SATURATION: 100 %

## 2024-11-07 LAB
ADD ON TEST-SPECIMEN IN LAB: SIGNIFICANT CHANGE UP
ADD ON TEST-SPECIMEN IN LAB: SIGNIFICANT CHANGE UP
ALBUMIN SERPL ELPH-MCNC: 3.7 G/DL — SIGNIFICANT CHANGE UP (ref 3.3–5)
ALP SERPL-CCNC: 34 U/L — LOW (ref 40–120)
ALT FLD-CCNC: 14 U/L — SIGNIFICANT CHANGE UP (ref 4–33)
ANION GAP SERPL CALC-SCNC: 14 MMOL/L — SIGNIFICANT CHANGE UP (ref 7–14)
APTT BLD: 30.4 SEC — SIGNIFICANT CHANGE UP (ref 24.5–35.6)
AST SERPL-CCNC: 18 U/L — SIGNIFICANT CHANGE UP (ref 4–32)
BASOPHILS # BLD AUTO: 0.04 K/UL — SIGNIFICANT CHANGE UP (ref 0–0.2)
BASOPHILS NFR BLD AUTO: 0.6 % — SIGNIFICANT CHANGE UP (ref 0–2)
BILIRUB SERPL-MCNC: 0.2 MG/DL — SIGNIFICANT CHANGE UP (ref 0.2–1.2)
BUN SERPL-MCNC: 12 MG/DL — SIGNIFICANT CHANGE UP (ref 7–23)
CALCIUM SERPL-MCNC: 8.8 MG/DL — SIGNIFICANT CHANGE UP (ref 8.4–10.5)
CHLORIDE SERPL-SCNC: 104 MMOL/L — SIGNIFICANT CHANGE UP (ref 98–107)
CO2 SERPL-SCNC: 21 MMOL/L — LOW (ref 22–31)
CREAT SERPL-MCNC: 0.79 MG/DL — SIGNIFICANT CHANGE UP (ref 0.5–1.3)
EGFR: 102 ML/MIN/1.73M2 — SIGNIFICANT CHANGE UP
EGFR: 102 ML/MIN/1.73M2 — SIGNIFICANT CHANGE UP
EOSINOPHIL # BLD AUTO: 0.09 K/UL — SIGNIFICANT CHANGE UP (ref 0–0.5)
EOSINOPHIL NFR BLD AUTO: 1.4 % — SIGNIFICANT CHANGE UP (ref 0–6)
GLUCOSE SERPL-MCNC: 86 MG/DL — SIGNIFICANT CHANGE UP (ref 70–99)
HCG SERPL-ACNC: <1 MIU/ML — SIGNIFICANT CHANGE UP
HCT VFR BLD CALC: 37.2 % — SIGNIFICANT CHANGE UP (ref 34.5–45)
HCV AB S/CO SERPL IA: 0.11 S/CO — SIGNIFICANT CHANGE UP (ref 0–0.99)
HCV AB SERPL-IMP: SIGNIFICANT CHANGE UP
HGB BLD-MCNC: 12 G/DL — SIGNIFICANT CHANGE UP (ref 11.5–15.5)
HIV 1+2 AB+HIV1 P24 AG SERPL QL IA: SIGNIFICANT CHANGE UP
HIV 1+2 AB+HIV1 P24 AG SERPL QL IA: SIGNIFICANT CHANGE UP
IANC: 3.28 K/UL — SIGNIFICANT CHANGE UP (ref 1.8–7.4)
IMM GRANULOCYTES NFR BLD AUTO: 0.3 % — SIGNIFICANT CHANGE UP (ref 0–0.9)
INR BLD: 1.01 RATIO — SIGNIFICANT CHANGE UP (ref 0.85–1.16)
LYMPHOCYTES # BLD AUTO: 2.32 K/UL — SIGNIFICANT CHANGE UP (ref 1–3.3)
LYMPHOCYTES # BLD AUTO: 36.5 % — SIGNIFICANT CHANGE UP (ref 13–44)
MCHC RBC-ENTMCNC: 28.2 PG — SIGNIFICANT CHANGE UP (ref 27–34)
MCHC RBC-ENTMCNC: 32.3 G/DL — SIGNIFICANT CHANGE UP (ref 32–36)
MCV RBC AUTO: 87.3 FL — SIGNIFICANT CHANGE UP (ref 80–100)
MONOCYTES # BLD AUTO: 0.61 K/UL — SIGNIFICANT CHANGE UP (ref 0–0.9)
MONOCYTES NFR BLD AUTO: 9.6 % — SIGNIFICANT CHANGE UP (ref 2–14)
NEUTROPHILS # BLD AUTO: 3.28 K/UL — SIGNIFICANT CHANGE UP (ref 1.8–7.4)
NEUTROPHILS NFR BLD AUTO: 51.6 % — SIGNIFICANT CHANGE UP (ref 43–77)
NRBC # BLD AUTO: 0 K/UL — SIGNIFICANT CHANGE UP (ref 0–0)
NRBC # BLD: 0 /100 WBCS — SIGNIFICANT CHANGE UP (ref 0–0)
NRBC # FLD: 0 K/UL — SIGNIFICANT CHANGE UP (ref 0–0)
NRBC BLD-RTO: 0 /100 WBCS — SIGNIFICANT CHANGE UP (ref 0–0)
NT-PROBNP SERPL-SCNC: <36 PG/ML — SIGNIFICANT CHANGE UP
PLATELET # BLD AUTO: 267 K/UL — SIGNIFICANT CHANGE UP (ref 150–400)
POTASSIUM SERPL-MCNC: 4.1 MMOL/L — SIGNIFICANT CHANGE UP (ref 3.5–5.3)
POTASSIUM SERPL-SCNC: 4.1 MMOL/L — SIGNIFICANT CHANGE UP (ref 3.5–5.3)
PROT SERPL-MCNC: 7 G/DL — SIGNIFICANT CHANGE UP (ref 6–8.3)
PROTHROM AB SERPL-ACNC: 12 SEC — SIGNIFICANT CHANGE UP (ref 9.9–13.4)
RBC # BLD: 4.26 M/UL — SIGNIFICANT CHANGE UP (ref 3.8–5.2)
RBC # FLD: 14.6 % — HIGH (ref 10.3–14.5)
SODIUM SERPL-SCNC: 139 MMOL/L — SIGNIFICANT CHANGE UP (ref 135–145)
TROPONIN T, HIGH SENSITIVITY RESULT: <6 NG/L — SIGNIFICANT CHANGE UP
WBC # BLD: 6.36 K/UL — SIGNIFICANT CHANGE UP (ref 3.8–10.5)
WBC # FLD AUTO: 6.36 K/UL — SIGNIFICANT CHANGE UP (ref 3.8–10.5)

## 2024-11-07 PROCEDURE — 93010 ELECTROCARDIOGRAM REPORT: CPT

## 2024-11-07 PROCEDURE — 73590 X-RAY EXAM OF LOWER LEG: CPT | Mod: 26,LT

## 2024-11-07 PROCEDURE — 93306 TTE W/DOPPLER COMPLETE: CPT | Mod: 26

## 2024-11-07 PROCEDURE — 71275 CT ANGIOGRAPHY CHEST: CPT | Mod: 26,MC

## 2024-11-07 PROCEDURE — 99223 1ST HOSP IP/OBS HIGH 75: CPT

## 2024-11-07 PROCEDURE — 93971 EXTREMITY STUDY: CPT | Mod: 26,LT

## 2024-11-07 RX ORDER — ACETAMINOPHEN 500 MG/5ML
1000 LIQUID (ML) ORAL ONCE
Refills: 0 | Status: COMPLETED | OUTPATIENT
Start: 2024-11-07 | End: 2024-11-07

## 2024-11-07 RX ADMIN — Medication 1000 MILLILITER(S): at 11:03

## 2024-11-07 RX ADMIN — Medication 1000 MILLIGRAM(S): at 21:59

## 2024-11-07 RX ADMIN — Medication 400 MILLIGRAM(S): at 11:03

## 2024-11-07 RX ADMIN — Medication 400 MILLIGRAM(S): at 21:09

## 2024-11-08 VITALS
RESPIRATION RATE: 18 BRPM | TEMPERATURE: 98 F | HEART RATE: 84 BPM | SYSTOLIC BLOOD PRESSURE: 114 MMHG | DIASTOLIC BLOOD PRESSURE: 80 MMHG | OXYGEN SATURATION: 98 %

## 2024-11-08 PROCEDURE — 99239 HOSP IP/OBS DSCHRG MGMT >30: CPT

## 2025-01-27 NOTE — ED PROVIDER NOTE - NSICDXPASTMEDICALHX_GEN_ALL_CORE_FT
NUTRITION SERVICES: BMI - Pt with BMI >40 (=Body mass index is 43.39 kg/m².), morbid obesity. Weight loss counseling not appropriate in acute care setting.     RECOMMEND - If appropriate at DC please refer to outpatient nutrition services for weight management.     PAST MEDICAL HISTORY:  Migraine

## 2025-02-03 ENCOUNTER — NON-APPOINTMENT (OUTPATIENT)
Age: 35
End: 2025-02-03

## 2025-05-06 NOTE — STROKE CODE NOTE - NSRESATTESTFEL_ED_ALL_CORE_FT
Consent: The patient's consent was obtained including but not limited to risks of crusting, scabbing, blistering, scarring, darker or lighter pigmentary change, recurrence, incomplete removal and infection. Medical Necessity Clause: This procedure was medically necessary because the lesions that were treated were: Render Post-Care Instructions In Note?: yes Number Of Freeze-Thaw Cycles: 3 freeze-thaw cycles Medical Necessity Information: It is in your best interest to select a reason for this procedure from the list below. All of these items fulfill various CMS LCD requirements except the new and changing color options. Render Note In Bullet Format When Appropriate: No Detail Level: Detailed Spray Paint Text: The liquid nitrogen was applied to the skin utilizing a spray paint frosting technique. Duration Of Freeze Thaw-Cycle (Seconds): 5-10 Post-Care Instructions: I reviewed with the patient in detail post-care instructions. Patient is to wear sunprotection, and avoid picking at any of the treated lesions. Pt may apply Vaseline to crusted or scabbing areas. Number Of Freeze-Thaw Cycles: 2 freeze-thaw cycles Duration Of Freeze Thaw-Cycle (Seconds): 2 Kamar Dumont

## 2025-06-30 ENCOUNTER — APPOINTMENT (OUTPATIENT)
Dept: GASTROENTEROLOGY | Facility: CLINIC | Age: 35
End: 2025-06-30
Payer: COMMERCIAL

## 2025-06-30 ENCOUNTER — NON-APPOINTMENT (OUTPATIENT)
Age: 35
End: 2025-06-30

## 2025-06-30 VITALS
HEIGHT: 63 IN | BODY MASS INDEX: 29.59 KG/M2 | HEART RATE: 86 BPM | SYSTOLIC BLOOD PRESSURE: 117 MMHG | TEMPERATURE: 98.7 F | DIASTOLIC BLOOD PRESSURE: 78 MMHG | WEIGHT: 167 LBS | OXYGEN SATURATION: 99 %

## 2025-06-30 PROCEDURE — 99203 OFFICE O/P NEW LOW 30 MIN: CPT
